# Patient Record
Sex: FEMALE | Race: WHITE
[De-identification: names, ages, dates, MRNs, and addresses within clinical notes are randomized per-mention and may not be internally consistent; named-entity substitution may affect disease eponyms.]

---

## 2017-03-01 ENCOUNTER — HOSPITAL ENCOUNTER (EMERGENCY)
Dept: HOSPITAL 25 - ED | Age: 82
Discharge: HOME | End: 2017-03-01
Payer: MEDICARE

## 2017-03-01 VITALS — DIASTOLIC BLOOD PRESSURE: 71 MMHG | SYSTOLIC BLOOD PRESSURE: 150 MMHG

## 2017-03-01 DIAGNOSIS — Z87.891: ICD-10-CM

## 2017-03-01 DIAGNOSIS — R53.1: Primary | ICD-10-CM

## 2017-03-01 DIAGNOSIS — R60.9: ICD-10-CM

## 2017-03-01 DIAGNOSIS — R50.9: ICD-10-CM

## 2017-03-01 LAB
ALBUMIN SERPL BCG-MCNC: 4.1 G/DL (ref 3.2–5.2)
ALP SERPL-CCNC: 61 U/L (ref 34–104)
ALT SERPL W P-5'-P-CCNC: 6 U/L (ref 7–52)
ANION GAP SERPL CALC-SCNC: 9 MMOL/L (ref 2–11)
AST SERPL-CCNC: 13 U/L (ref 13–39)
BUN SERPL-MCNC: 14 MG/DL (ref 6–24)
BUN/CREAT SERPL: 19.7 (ref 8–20)
CALCIUM SERPL-MCNC: 10.2 MG/DL (ref 8.6–10.3)
CHLORIDE SERPL-SCNC: 101 MMOL/L (ref 101–111)
GLOBULIN SER CALC-MCNC: 2.9 G/DL (ref 2–4)
GLUCOSE SERPL-MCNC: 122 MG/DL (ref 70–100)
HCO3 SERPL-SCNC: 27 MMOL/L (ref 22–32)
HCT VFR BLD AUTO: 47 % (ref 35–47)
HGB BLD-MCNC: 14.8 G/DL (ref 12–16)
MAGNESIUM SERPL-MCNC: 1.8 MG/DL (ref 1.9–2.7)
MCH RBC QN AUTO: 30 PG (ref 27–31)
MCHC RBC AUTO-ENTMCNC: 32 G/DL (ref 31–36)
MCV RBC AUTO: 94 FL (ref 80–97)
POTASSIUM SERPL-SCNC: 4.1 MMOL/L (ref 3.5–5)
PROT SERPL-MCNC: 7 G/DL (ref 6.4–8.9)
RBC # BLD AUTO: 4.97 10^6/UL (ref 4–5.4)
SODIUM SERPL-SCNC: 137 MMOL/L (ref 133–145)
TROPONIN I SERPL-MCNC: 0 NG/ML (ref ?–0.04)
TSH SERPL-ACNC: 1.01 MCIU/ML (ref 0.34–5.6)
WBC # BLD AUTO: 7.7 10^3/UL (ref 3.5–10.8)

## 2017-03-01 PROCEDURE — 80053 COMPREHEN METABOLIC PANEL: CPT

## 2017-03-01 PROCEDURE — 99283 EMERGENCY DEPT VISIT LOW MDM: CPT

## 2017-03-01 PROCEDURE — 83880 ASSAY OF NATRIURETIC PEPTIDE: CPT

## 2017-03-01 PROCEDURE — 36415 COLL VENOUS BLD VENIPUNCTURE: CPT

## 2017-03-01 PROCEDURE — 84484 ASSAY OF TROPONIN QUANT: CPT

## 2017-03-01 PROCEDURE — 81003 URINALYSIS AUTO W/O SCOPE: CPT

## 2017-03-01 PROCEDURE — 71020: CPT

## 2017-03-01 PROCEDURE — 85730 THROMBOPLASTIN TIME PARTIAL: CPT

## 2017-03-01 PROCEDURE — 85025 COMPLETE CBC W/AUTO DIFF WBC: CPT

## 2017-03-01 PROCEDURE — 74176 CT ABD & PELVIS W/O CONTRAST: CPT

## 2017-03-01 PROCEDURE — 87040 BLOOD CULTURE FOR BACTERIA: CPT

## 2017-03-01 PROCEDURE — 83605 ASSAY OF LACTIC ACID: CPT

## 2017-03-01 PROCEDURE — 81015 MICROSCOPIC EXAM OF URINE: CPT

## 2017-03-01 PROCEDURE — 83735 ASSAY OF MAGNESIUM: CPT

## 2017-03-01 PROCEDURE — 84443 ASSAY THYROID STIM HORMONE: CPT

## 2017-03-01 PROCEDURE — 86140 C-REACTIVE PROTEIN: CPT

## 2017-03-01 NOTE — RAD
Indication: Fever, weakness.



Comparison: September 05, 2016 abdomen CT and September 05, 2016 chest radiograph.

September 05, 2016 thoracic spine CT.



Technique: Sitting AP and lateral chest views.



Report: Elevated lung volumes and both mild prominence and rarefaction of the interstitial

markings. No alveolar consolidation, focal pulmonary lesion, pleural effusion,

pneumothorax. Upper normal heart size. Unremarkable central pulmonary vasculature. T8

vertebral compression fracture is chronic. Bone density appears decreased throughout.



IMPRESSION: Stigmata of chronic obstructive pulmonary disease and emphysema. No acute

cardiopulmonary process evident.

## 2017-03-01 NOTE — RAD
INDICATION: Weakness, multiple falls. Remote appendectomy. .



COMPARISON:  2016 and May 25, 2014 CT exams.



TECHNIQUE: Multidetector CT images were obtained from the lung bases to the ischial

tuberosities. Evaluation of the viscera is limited without IV contrast. Multiplanar

reformation.



REPORT: Visualized lung bases are remarkable for emphysema.



Unremarkable unenhanced liver and gallbladder. Moderately atrophic pancreas without

suspicious finding. Unremarkable spleen.



Large medially projecting diverticulum from the second segment of the duodenum without

suggestion of inflammatory change. Unremarkable unopacified small bowel loops. Moderate

diverticulosis primarily at the sigmoid colon without findings of diverticulitis. Small

volume of free fluid in the pelvis. Negative for free air or significant hernias.



Normal adrenal glands. Negative for hydronephrosis. There are bilateral renal cysts with

large cyst at the RIGHT kidney with multiple demonstrating marginal calcification without

significant change in size or degree of calcification compared with the 2014 CT consistent

with benign etiology. The dominant cyst at the LEFT kidney is at the upper pole measuring

up to 8.3 cm diameter. No renal collecting system stones or hydronephrosis. Unremarkable

nondilated ureters. Unremarkable moderately distended urinary bladder. No significant

change in calcified uterine fibroids compared with the 2014 exam. Unremarkable adnexal

regions.



Negative for lymphadenopathy. Normal diameter abdominal aorta and iliac arteries with

significant calcific plaque.



Negative for retroperitoneal or superficial soft tissue plane hematoma.



Polyarticular degenerative arthropathy. Moderate RIGHT convex curve of the lumbar spine.

No fractures or suspicious focal osseous lesions evident.



IMPRESSION: 

1. No acute abdominal pelvic pathologic process evident.

2. Moderate colonic diverticulosis without findings of diverticulitis.

3. No significant change in large LEFT renal cyst with mural calcification compared with

the 2014 exam.

4. Negative for obstructive uropathy.

5. Negative for aortic aneurysm.

6. Negative for loculated hematoma or fracture.

## 2017-03-02 NOTE — ED
Nigel HEREDIA Adam, scribed for Alli Dolan MD on 17 at 1902 .





Complex/Multi-Sys Presentation





- HPI Summary


HPI Summary: 


Pt is an 86 year old female presenting with increasing weakness. Her daughter 

states that the pt has grown very weak in the past few days and is no longer 

able to walk. She has fallen 2x in the last few days which is rare for her. She 

also presents with left foot swelling with redness. Last week her prednisone 

dosage was raised from 10 to 40. Today is her 4th day on 40 mg and she is not 

doing well. In addition to the weakness and LLE symptoms, her daughter states 

that she has been having generalized myalgia and she seems to have a low fever. 

Pt is a former smoker. PMHx includes lupus, HTN, DVT, GERD, osteoporosis, 

depression, and sciatic pain.








- History Of Current Complaint


Chief Complaint: EDWeakness


Time Seen by Provider: 17 18:51


Hx Obtained From: Patient


Onset/Duration: Gradual Onset, Lasting Days, Still Present


Timing: Constant


Severity Currently: Moderate


Severity Initially: Mild


Location: Pain At: - Generalized


Associated Signs And Symptoms: Positive: Weakness, Edema - Left foot, with 

redness, Fever





- Allergies/Home Medications


Allergies/Adverse Reactions: 


 Allergies











Allergy/AdvReac Type Severity Reaction Status Date / Time


 


Penicillins [PCN] Allergy Intermediate Rash Verified 16 11:22














PMH/Surg Hx/FS Hx/Imm Hx


Endocrine/Hematology History: Reports: Hx Systemic Lupus Erythematosus, Other 

Endocrine/Hematological Disorders - LUPUS


   Denies: Hx Anticoagulant Therapy, Hx Blood Disorders, Hx Blood Transfusions, 

Hx Bone Marrow Disease, Hx Diabetes, Hx Sickle Cell Disease, Hx Thyroid Disease

, Hx Anemia, Hx Unexplained Bleeding


Cardiovascular History: Reports: Hx Deep Vein Thrombosis, Hx Hypertension, 

Other Cardiovascular Problems/Disorders - LUPUS


   Denies: Hx Congestive Heart Failure, Hx Pacemaker/ICD


Respiratory History: 


   Denies: Hx Asthma, Hx Chronic Obstructive Pulmonary Disease (COPD)


GI History: Reports: Hx Gastroesophageal Reflux Disease, Hx Irritable Bowel, 

Other GI Disorders - GERD


   Denies: Hx Cirrhosis, Hx Crohn's Disease, Hx Diverticulosis, Hx Gall Bladder 

Disease, Hx Gastrointestinal Bleed, Hx Hiatal Hernia, Hx Jaundice, Hx 

Obstructive Bowel, Hx Ileostomy, Hx Pyloric Stenosis, Hx Ulcer


 History: Reports: Other  Problems/Disorders - UTIs several x's a yr


   Denies: Hx Dialysis, Hx Renal Disease


Musculoskeletal History: Reports: Hx Arthritis, Hx Back Problems, Hx 

Fibromyalgia, Other Musculoskeletal History - DJD-NECK, FIBROMYALGIA, 

RHEUMATOID ARTHRITIS


   Denies: Hx Rheumatoid Arthritis, Hx Bursitis, Hx Congenital Bone 

Abnormalities, Hx Gout, Hx Orthopedic Injury, Hx Osteoporosis, Hx Scoliosis, Hx 

Tendonitis


Sensory History: Reports: Hx Cataracts, Hx Contacts or Glasses, Hx Vision 

Problem


   Denies: Hx Eye Injury, Hx Eye Prosthesis, Hx Glaucoma, Hx Macular 

Degeneration, Hx Deafness, Hx Hearing Aid, Hx Hearing Problem, Other Sensory 

Impairments


Opthamlomology History: Reports: Hx Cataracts, Hx Contacts or Glasses, Hx 

Vision Problem


   Denies: Hx Eye Injury, Hx Eye Prosthesis, Hx Glaucoma, Hx Macular 

Degeneration, Other Sensory Impairments


Neurological History: 


   Denies: Hx Dementia, Hx Seizures


Psychiatric History: Reports: Hx Anxiety - LUPUS RELATED, Hx Eating Disorder, 

Hx Depression - LUPUS RELATED


   Denies: Hx Attention Deficit Hyperactivity Disorder, Hx Panic Disorder, Hx 

Post Traumatic Stress Disorder, Hx Inpatient Treatment, Hx Schizophrenia, Hx 

Bipolar Disorder, Hx Suicide Attempt, Hx Substance Abuse, Other Psychiatric 

Issues/Disorders





- Cancer History


Hx Chemotherapy: No





- Surgical History


Surgery Procedure, Year, and Place: .  APPENDECTOMY 40 YEARS AGO (?)


Hx Anesthesia Reactions: No


Infectious Disease History: No


Infectious Disease History: 


   Denies: Hx Clostridium Difficile, Hx Hepatitis, Hx Human Immunodeficiency 

Virus (HIV), Hx of Known/Suspected MRSA, Hx Shingles, Hx Tuberculosis, Traveled 

Outside the [place] in Last 30 Days





- Family History


Known Family History: Positive: None - Pt denies any FMHx





- Social History


Occupation: Retired


Lives: Alone


Alcohol Use: None


Hx Substance Use: No


Substance Use Type: Reports: None


Hx Tobacco Use: Yes


Smoking Status (MU): Former Smoker


Type: Cigarettes


Amount Used/How Often: 40 years


Have You Smoked in the Last Year: No





Review of Systems


Positive: Fever


Negative: Diarrhea


Positive: Myalgia - Generalized, Edema - Left foot


Positive: Other - Redness, left foot


Positive: Weakness


All Other Systems Reviewed And Are Negative: Yes





Physical Exam





- Summary


Physical Exam Summary: 





VITAL SIGNS: Reviewed. 


GENERAL: Patient is a this female who is lying comfortable in the stretcher. 

Patient is not in any acute respiratory distress. 


HEAD AND FACE: No signs of trauma. No ecchymosis, hematomas or skull 

depressions. No sinus tenderness. 


EYES: PERRLA, EOMI x 2, No injected conjunctiva, no nystagmus. No photophobia.


EARS: Hearing grossly intact. Ear canals and tympanic membranes are within 

normal limits. 


MOUTH: Oropharynx within normal limits. 


NECK: Supple, trachea is midline, no adenopathy, no JVD, no carotid bruit, no c-

spine tenderness, neck with full ROM. No meningeal signs, no Kernig's or 

brudzinskis signs. 


CHEST: Symmetric, no tenderness at palpation 


LUNGS: Clear to auscultation bilaterally. No wheezing or crackles.


CVS: Regular rate and rhythm, S1 and S2 present, no murmurs or gallops 

appreciated. 


ABDOMEN: Soft, non-tender. No signs of distention. No rebound no guarding, and 

no masses palpated. Bowel sounds are normal. 


EXTREMITIES: FROM in all major joints, no edema, no cyanosis or clubbing.


NEURO: Alert and oriented x 3. No acute neurological deficits. Speech is normal 

and follows commands. 


SKIN: Dry and warm 





Triage Information Reviewed: Yes


Vital Signs On Initial Exam: 


 Initial Vitals











Temp Pulse Resp BP Pulse Ox


 


 101 F   72   20   129/81   98 


 


 17 18:04  17 18:04  17 18:04  17 18:04  17 18:04











Vital Signs Reviewed: Yes





- Danielle Coma Scale


Coma Scale Total: 15





Diagnostics





- Vital Signs


 Vital Signs











  Temp Pulse Resp BP Pulse Ox


 


 17 18:15   70    97


 


 17 18:04  101 F  72  20  129/81  98














- Laboratory


Lab Results: 


 Lab Results











  17 Range/Units





  20:05 20:05 20:05 


 


WBC  7.7    (3.5-10.8)  10^3/ul


 


RBC  4.97    (4.0-5.4)  10^6/ul


 


Hgb  14.8    (12.0-16.0)  g/dl


 


Hct  47    (35-47)  %


 


MCV  94    (80-97)  fL


 


MCH  30    (27-31)  pg


 


MCHC  32    (31-36)  g/dl


 


RDW  15    (10.5-15)  %


 


Plt Count  306    (150-450)  10^3/ul


 


MPV  8    (7.4-10.4)  um3


 


Neut % (Auto)  88.0 H    (38-83)  %


 


Lymph % (Auto)  10.4 L    (25-47)  %


 


Mono % (Auto)  1.3    (1-9)  %


 


Eos % (Auto)  0    (0-6)  %


 


Baso % (Auto)  0.3    (0-2)  %


 


Absolute Neuts (auto)  6.8    (1.5-7.7)  10^3/ul


 


Absolute Lymphs (auto)  0.8 L    (1.0-4.8)  10^3/ul


 


Absolute Monos (auto)  0.1    (0-0.8)  10^3/ul


 


Absolute Eos (auto)  0    (0-0.6)  10^3/ul


 


Absolute Basos (auto)  0    (0-0.2)  10^3/ul


 


Absolute Nucleated RBC  0.01    10^3/ul


 


Nucleated RBC %  0.1    


 


APTT   31.1   (26.0-36.3)  seconds


 


Sodium    137  (133-145)  mmol/L


 


Potassium    4.1  (3.5-5.0)  mmol/L


 


Chloride    101  (101-111)  mmol/L


 


Carbon Dioxide    27  (22-32)  mmol/L


 


Anion Gap    9  (2-11)  mmol/L


 


BUN    14  (6-24)  mg/dL


 


Creatinine    0.71  (0.51-0.95)  mg/dL


 


Est GFR ( Amer)    100.4  (>60)  


 


Est GFR (Non-Af Amer)    78.1  (>60)  


 


BUN/Creatinine Ratio    19.7  (8-20)  


 


Glucose    122 H  ()  mg/dL


 


Lactic Acid     (0.5-2.0)  mmol/L


 


Calcium    10.2  (8.6-10.3)  mg/dL


 


Magnesium    1.8 L  (1.9-2.7)  mg/dL


 


Total Bilirubin    0.40  (0.2-1.0)  mg/dL


 


AST    13  (13-39)  U/L


 


ALT    6 L  (7-52)  U/L


 


Alkaline Phosphatase    61  ()  U/L


 


Troponin I    0.00  (<0.04)  ng/mL


 


C-Reactive Protein    1.94  (< 5.00)  mg/L


 


B-Natriuretic Peptide    ( - 100) pg/mL


 


Total Protein    7.0  (6.4-8.9)  g/dL


 


Albumin    4.1  (3.2-5.2)  g/dL


 


Globulin    2.9  (2-4)  g/dL


 


Albumin/Globulin Ratio    1.4  (1-3)  


 


TSH    1.01  (0.34-5.60)  mcIU/mL














  17 Range/Units





  20:05 20:05 


 


WBC    (3.5-10.8)  10^3/ul


 


RBC    (4.0-5.4)  10^6/ul


 


Hgb    (12.0-16.0)  g/dl


 


Hct    (35-47)  %


 


MCV    (80-97)  fL


 


MCH    (27-31)  pg


 


MCHC    (31-36)  g/dl


 


RDW    (10.5-15)  %


 


Plt Count    (150-450)  10^3/ul


 


MPV    (7.4-10.4)  um3


 


Neut % (Auto)    (38-83)  %


 


Lymph % (Auto)    (25-47)  %


 


Mono % (Auto)    (1-9)  %


 


Eos % (Auto)    (0-6)  %


 


Baso % (Auto)    (0-2)  %


 


Absolute Neuts (auto)    (1.5-7.7)  10^3/ul


 


Absolute Lymphs (auto)    (1.0-4.8)  10^3/ul


 


Absolute Monos (auto)    (0-0.8)  10^3/ul


 


Absolute Eos (auto)    (0-0.6)  10^3/ul


 


Absolute Basos (auto)    (0-0.2)  10^3/ul


 


Absolute Nucleated RBC    10^3/ul


 


Nucleated RBC %    


 


APTT    (26.0-36.3)  seconds


 


Sodium    (133-145)  mmol/L


 


Potassium    (3.5-5.0)  mmol/L


 


Chloride    (101-111)  mmol/L


 


Carbon Dioxide    (22-32)  mmol/L


 


Anion Gap    (2-11)  mmol/L


 


BUN    (6-24)  mg/dL


 


Creatinine    (0.51-0.95)  mg/dL


 


Est GFR ( Amer)    (>60)  


 


Est GFR (Non-Af Amer)    (>60)  


 


BUN/Creatinine Ratio    (8-20)  


 


Glucose    ()  mg/dL


 


Lactic Acid  1.1   (0.5-2.0)  mmol/L


 


Calcium    (8.6-10.3)  mg/dL


 


Magnesium    (1.9-2.7)  mg/dL


 


Total Bilirubin    (0.2-1.0)  mg/dL


 


AST    (13-39)  U/L


 


ALT    (7-52)  U/L


 


Alkaline Phosphatase    ()  U/L


 


Troponin I    (<0.04)  ng/mL


 


C-Reactive Protein    (< 5.00)  mg/L


 


B-Natriuretic Peptide   217 H ( - 100) pg/mL


 


Total Protein    (6.4-8.9)  g/dL


 


Albumin    (3.2-5.2)  g/dL


 


Globulin    (2-4)  g/dL


 


Albumin/Globulin Ratio    (1-3)  


 


TSH    (0.34-5.60)  mcIU/mL











Result Diagrams: 


 17 20:05





 17 20:05


Lab Statement: Any lab studies that have been ordered have been reviewed, and 

results considered in the medical decision making process.





- Radiology


  ** CXR


Radiology Interpretation Completed By: Radiologist - IMPRESSION: Stigmata of 

chronic obstructive pulmonary disease and emphysema. No acute cardiopulmonary 

process evident.





- CT


  ** A/P


CT Interpretation Completed By: Radiologist - IMPRESSION:  1. No acute 

abdominal pelvic pathologic process evident. 2. Moderate colonic diverticulosis 

without findings of diverticulitis. 3. No significant change in large LEFT 

renal cyst with mural calcification compared with the 2014 exam. 4. Negative 

for obstructive uropathy. 5. Negative for aortic aneurysm. 6. Negative for 

loculated hematoma or fracture.





- Additional Comments


Diagnostic Additional Comments: 


Troponin I - 0.00








Complex Multi-Symp Course/Dx


Assessment/Plan: Pt is an 86 year old female presenting with increasing 

weakness. Her daughter states that the pt has grown very weak in the past few 

days and is no longer able to walk. She has fallen 2x in the last few days 

which is rare for her. She also presents with left foot swelling with redness. 

Last week her prednisone dosage was raised from 10 to 40. Today is her 4th day 

on 40 mg and she is not doing well. In addition to the weakness and LLE symptoms

, her daughter states that she has been having generalized myalgia and she 

seems to have a low fever. Pt is a former smoker. PMHx includes lupus, HTN, DVT

, GERD, osteoporosis, depression, and sciatic pain.  Fall precautions were 

ordered.  Blood work shows hyperglycemia of 122, Mg+ 1.8 .  Initially 

she was given Magnesium.  Abdominal and pelvic CT was done since she report 

occasional abdominal pain. Impression as above.  I did not perform an Head CT 

since she is neurological intact. She is ambulating in the ED with good steady 

walk.  CXR: Stigmata for COPD, with no acute disease.  In the Ed course she was 

ambulated with good steady walk. Daughter reports that she will staying with 

her at home and she will be moving with her in the next couple days. She will 

be discharged home with f/u of PMD in the next 1-2 days.





- Diagnoses


Differential Diagnoses/HQI/PQRI: Other - Mechanical falls, UTI, weakness


Provider Diagnoses: 


 Weakness, Accidental fall








Discharge





- Discharge Plan


Condition: Stable


Disposition: HOME


Patient Education Materials:  Weakness (ED), Fall Prevention for Older Adults (

ED)


Referrals: 


Chato Sapp MD [Primary Care Provider] - 


Additional Instructions: 


Follow up with Dr. Sapp.





The documentation as recorded by the Nigel yi Adam accurately reflects 

the service I personally performed and the decisions made by me, Alli Dolan MD.

## 2017-07-24 ENCOUNTER — HOSPITAL ENCOUNTER (EMERGENCY)
Dept: HOSPITAL 25 - ED | Age: 82
Discharge: LEFT BEFORE DISCHARGE | End: 2017-07-24
Payer: MEDICARE

## 2017-07-24 DIAGNOSIS — Z53.21: Primary | ICD-10-CM

## 2017-07-24 PROCEDURE — 99281 EMR DPT VST MAYX REQ PHY/QHP: CPT

## 2017-09-27 ENCOUNTER — HOSPITAL ENCOUNTER (INPATIENT)
Dept: HOSPITAL 25 - ED | Age: 82
LOS: 3 days | Discharge: HOME HEALTH | DRG: 690 | End: 2017-09-30
Attending: INTERNAL MEDICINE | Admitting: INTERNAL MEDICINE
Payer: MEDICARE

## 2017-09-27 DIAGNOSIS — Z98.41: ICD-10-CM

## 2017-09-27 DIAGNOSIS — K57.92: ICD-10-CM

## 2017-09-27 DIAGNOSIS — F32.9: ICD-10-CM

## 2017-09-27 DIAGNOSIS — Z87.891: ICD-10-CM

## 2017-09-27 DIAGNOSIS — D73.5: ICD-10-CM

## 2017-09-27 DIAGNOSIS — N39.0: Primary | ICD-10-CM

## 2017-09-27 DIAGNOSIS — K21.9: ICD-10-CM

## 2017-09-27 DIAGNOSIS — K57.30: ICD-10-CM

## 2017-09-27 DIAGNOSIS — I10: ICD-10-CM

## 2017-09-27 DIAGNOSIS — Z88.0: ICD-10-CM

## 2017-09-27 DIAGNOSIS — M32.9: ICD-10-CM

## 2017-09-27 DIAGNOSIS — Z79.52: ICD-10-CM

## 2017-09-27 DIAGNOSIS — M06.9: ICD-10-CM

## 2017-09-27 DIAGNOSIS — K58.9: ICD-10-CM

## 2017-09-27 DIAGNOSIS — Z98.42: ICD-10-CM

## 2017-09-27 DIAGNOSIS — G89.29: ICD-10-CM

## 2017-09-27 DIAGNOSIS — M79.7: ICD-10-CM

## 2017-09-27 DIAGNOSIS — B96.1: ICD-10-CM

## 2017-09-27 DIAGNOSIS — M19.90: ICD-10-CM

## 2017-09-27 DIAGNOSIS — Z79.01: ICD-10-CM

## 2017-09-27 DIAGNOSIS — F50.9: ICD-10-CM

## 2017-09-27 DIAGNOSIS — Z86.718: ICD-10-CM

## 2017-09-27 DIAGNOSIS — F41.9: ICD-10-CM

## 2017-09-27 DIAGNOSIS — N28.1: ICD-10-CM

## 2017-09-27 LAB
ADD DIFF/SLIDE REVIEW?: (no result)
ALBUMIN SERPL BCG-MCNC: 3.5 G/DL (ref 3.2–5.2)
ALP SERPL-CCNC: 49 U/L (ref 34–104)
ALT SERPL W P-5'-P-CCNC: 10 U/L (ref 7–52)
ANION GAP SERPL CALC-SCNC: 7 MMOL/L (ref 2–11)
AST SERPL-CCNC: 13 U/L (ref 13–39)
BUN SERPL-MCNC: 17 MG/DL (ref 6–24)
BUN/CREAT SERPL: 22.4 (ref 8–20)
CALCIUM SERPL-MCNC: 8.8 MG/DL (ref 8.6–10.3)
CHLORIDE SERPL-SCNC: 103 MMOL/L (ref 101–111)
GLOBULIN SER CALC-MCNC: 2.4 G/DL (ref 2–4)
GLUCOSE SERPL-MCNC: 119 MG/DL (ref 70–100)
HCO3 SERPL-SCNC: 26 MMOL/L (ref 22–32)
HCT VFR BLD AUTO: 44 % (ref 35–47)
HGB BLD-MCNC: 14.2 G/DL (ref 12–16)
LIPASE SERPL-CCNC: 26 U/L (ref 11–82)
MCH RBC QN AUTO: 30 PG (ref 27–31)
MCHC RBC AUTO-ENTMCNC: 32 G/DL (ref 31–36)
MCV RBC AUTO: 93 FL (ref 80–97)
POTASSIUM SERPL-SCNC: 4.4 MMOL/L (ref 3.5–5)
PROT SERPL-MCNC: 5.9 G/DL (ref 6.4–8.9)
RBC # BLD AUTO: 4.72 10^6/UL (ref 4–5.4)
SODIUM SERPL-SCNC: 136 MMOL/L (ref 133–145)
TROPONIN I SERPL-MCNC: 0 NG/ML (ref ?–0.04)
TSH SERPL-ACNC: 0.93 MCIU/ML (ref 0.34–5.6)
WBC # BLD AUTO: 11.1 10^3/UL (ref 3.5–10.8)

## 2017-09-27 PROCEDURE — 87077 CULTURE AEROBIC IDENTIFY: CPT

## 2017-09-27 PROCEDURE — 74177 CT ABD & PELVIS W/CONTRAST: CPT

## 2017-09-27 PROCEDURE — 85610 PROTHROMBIN TIME: CPT

## 2017-09-27 PROCEDURE — 81015 MICROSCOPIC EXAM OF URINE: CPT

## 2017-09-27 PROCEDURE — 84443 ASSAY THYROID STIM HORMONE: CPT

## 2017-09-27 PROCEDURE — 80053 COMPREHEN METABOLIC PANEL: CPT

## 2017-09-27 PROCEDURE — 81003 URINALYSIS AUTO W/O SCOPE: CPT

## 2017-09-27 PROCEDURE — 80048 BASIC METABOLIC PNL TOTAL CA: CPT

## 2017-09-27 PROCEDURE — 83690 ASSAY OF LIPASE: CPT

## 2017-09-27 PROCEDURE — 85025 COMPLETE CBC W/AUTO DIFF WBC: CPT

## 2017-09-27 PROCEDURE — 36415 COLL VENOUS BLD VENIPUNCTURE: CPT

## 2017-09-27 PROCEDURE — 87493 C DIFF AMPLIFIED PROBE: CPT

## 2017-09-27 PROCEDURE — 71020: CPT

## 2017-09-27 PROCEDURE — 87186 SC STD MICRODIL/AGAR DIL: CPT

## 2017-09-27 PROCEDURE — 84484 ASSAY OF TROPONIN QUANT: CPT

## 2017-09-27 PROCEDURE — 86140 C-REACTIVE PROTEIN: CPT

## 2017-09-27 PROCEDURE — 87086 URINE CULTURE/COLONY COUNT: CPT

## 2017-09-27 PROCEDURE — 85379 FIBRIN DEGRADATION QUANT: CPT

## 2017-09-27 RX ADMIN — CEFTRIAXONE SODIUM SCH MLS/HR: 1 INJECTION, POWDER, FOR SOLUTION INTRAMUSCULAR; INTRAVENOUS at 21:38

## 2017-09-27 RX ADMIN — CARVEDILOL SCH MG: 3.12 TABLET, FILM COATED ORAL at 21:38

## 2017-09-27 RX ADMIN — CLONAZEPAM SCH MG: 0.5 TABLET ORAL at 21:39

## 2017-09-27 RX ADMIN — MYCOPHENOLATE MOFETIL SCH MG: 500 TABLET, FILM COATED ORAL at 21:41

## 2017-09-27 RX ADMIN — SODIUM CHLORIDE SCH MLS/HR: 900 IRRIGANT IRRIGATION at 21:47

## 2017-09-27 RX ADMIN — GABAPENTIN SCH MG: 300 CAPSULE ORAL at 21:39

## 2017-09-27 RX ADMIN — BUSPIRONE HYDROCHLORIDE SCH MG: 5 TABLET ORAL at 21:39

## 2017-09-27 NOTE — HP
CC:  Dr. Sapp*

 

HISTORY AND PHYSICAL:

 

DATE OF ADMISSION:  09/27/17

 

PRIMARY CARE PROVIDER:  Dr. Sapp.

 

CHIEF COMPLAINT:  Abdominal pain.

 

HISTORY OF PRESENT ILLNESS:  Jillian Golden is an 86-year-old female with a 
history of lupus, anxiety, tardive dyskinesia, irritable bowel syndrome, who 
presents to the hospital complaining of 3 months of abdominal pain.  She 
apparently had a CT of the abdomen to be obtained as an outpatient, but she 
came into the hospital for evaluation.  Three days ago, she developed diarrhea.
  She stated that she is incontinent of her bowel movements and she is unsure 
how often she goes, but her daughter stated that her bowel movements are now 
"pure liquid."  She had been having diarrhea for 3 days.  The abdominal pain is 
chronic, intermittent, and localized in the left mid abdomen.  The diarrhea got 
her weaker and they came into the ED for evaluation.  Here, her CT of the 
abdomen is basically unremarkable apart from possibility of  bezoar in the 
cecum.  She also has possible UTI.  She is going to be admitted for an 
overnight observation.

 

PAST MEDICAL HISTORY:

1.  SLE, on CellCept and prednisone.

2.  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 
on Coumadin.

3.  History of anxiety and depression.

4.  Chronic pain due to T8 compression fracture and scoliosis.

5.  History of small bowel obstruction, status post exploratory laparotomy in 
2015 with removal of bezoar by Dr. Graves as well as abscess of the appendix at 
the same time.

6.  History of osteoarthritis.

7.  Gastroesophageal reflux disease.

8.  Irritable bowel syndrome.

9.  Cataract surgery bilaterally.

10.  Hypertension.

 

OUTPATIENT MEDICATIONS:  Include:

 

1.  Coumadin 5 mg daily.

2.  Baclofen 10 mg t.i.d. p.r.n.

3.  Fosamax 70 mg weekly.

4.  BuSpar 5 mg b.i.d.

5.  Calcium carbonate 1 tablet daily.

6.  Gabapentin 300 mg at bedtime.

7.  Coreg 3.125 mg b.i.d.

8.  Compazine 10 mg daily p.r.n.

9.  Paroxetine 30 mg daily.

10.  CellCept 500 mg b.i.d.

11.  Norco 5/325 one tablet up to 4 times a day p.r.n.

12.  Prednisone 10 mg daily.

13.  Clonazepam 0.5 mg at bedtime.

 

ALLERGIES:  PENICILLIN.

 

FAMILY HISTORY:  Positive for diabetes and heart disease.

 

SOCIAL HISTORY:  The patient denies any current smoking.  She quit smoking 40 
years ago.  There is a history of 10-pack-year smoking.  She denies any alcohol 
or drug use.  She lives at her own trailer.  Her daughter, Holly, is her 
healthcare proxy. Her other daughter is an emergency room physician, who lives 
in [place].  She ambulates with a roller walker.

 

REVIEW OF SYSTEMS:  Please see history of present illness.  In addition to the 
above mentioned, all other 14 systems were reviewed and were otherwise negative.

 

                               PHYSICAL EXAMINATION

 

GENERAL APPEARANCE:  The patient is a very pleasant 86-year-old female, who is 
in no acute distress.  Alert, awake, and oriented x3.

 

VITAL SIGNS:  Blood pressure of 131/67, heart rate of 109 on room air, 
respiratory rate 18, oxygen saturation 95% on room air, and temperature of 97.7.

 

HEENT:  Head atraumatic, normocephalic.  Eyes, pupils are equal, round, and 
reactive to light and accommodation.  Oropharynx clear.  Mucosa moist.

 

NECK:  Supple.  No JVD, no bruits bilaterally.

 

RESPIRATORY:  Clear to auscultation bilaterally.

 

CARDIOVASCULAR:  Regular rate and rhythm.  No murmur.

 

ABDOMEN:  Soft, distended, nontender.  Bowel sounds are present in all 4 
quadrants.

 

EXTREMITIES:  There is no edema.  Pulses are +2 bilaterally.  No clubbing or 
cyanosis.

 

NEUROLOGIC:  Noted frequent smacking of the lips due to tardive dyskinesia.  
Motor strength otherwise is 5/5 bilaterally.  Sensation is intact throughout.  
Speech is clear.

 

 LABORATORY DATA:  Showed white blood cell count of 11.1, hemoglobin of 14.2, 
hematocrit of 44, and platelets of 315.

 

Sodium of 136, potassium 4.4, chloride 103, carbon dioxide 26, BUN 17, 
creatinine 0.76.  Liver function test is unremarkable.  C-reactive protein of 7.

 

Urinalysis showed +3 wbc's, +2 bacteria, +3 esterase, and +2 blood.

 

CT of abdomen and pelvis obtained at admission.  Impression:  "Small 
pericardial effusion at the base of the heart.  Mild nonspecific enhancement of 
gallbladder wall.  Stable low density splenic lesion.  Numerous large left 
renal cysts and small right renal cysts with peripheral calcifications, there 
is stability. Suspect ingested foreign body residing in the cecum.  This is 
likely an incidental finding.  Scattered diverticula of the sigmoid and 
descending colon.  Uterine leiomyomas."

 

Portable chest x-ray.  Impression:  "Hyperinflation consistent with COPD.  No 
active cardiopulmonary disease."

 

ASSESSMENT AND PLAN:

1.  In regards to the patient's abdominal pain, I suspect that the patient may 
have another bezoar that caused for the patient to be likely constipated.  She 
probably now has liquid stool that is flowing around the bezoar.  At this point
, I will observe the patient on medical floor.  I will place her on simethicone 
for gas pains.  Also asked gastroenterologist to see the patient in the morning.

2.  In regards to the patient's urinary tract infection, the patient was 
started on Levaquin.  I will place her on ceftriaxone beginning tomorrow.

3.  For the patient's lupus, the patient is going to be continued on CellCept 
and prednisone.

4.  In regards to the patient's history of splenic infarcts, the patient's INR 
is 1.96 and her current Coumadin dose is going to be continued.

5.  For the patient's diarrhea, once again, it is probably flowing around the 
bezoar.  At this point I will check it for C. diff though.

6.  For DVT prophylaxis, the patient is to be continued on Coumadin with daily 
INRs.

7.  For hypertension, Coreg is going to be continued.

8.  For code status, the patient is a full code and her surrogate is her 
daughter, Holly.

 

TIME SPENT:  Approximately 60 minutes were spent on admission of this patient, 
more than half that time was spent face-to-face with the patient during the 
interview and physical exam.

 

 

 

[id number]/CPS #: [id number]

YAYO

## 2017-09-27 NOTE — RAD
INDICATION: Left upper quadrant pain     



COMPARISON: CT March 01, 2017; CT September 05, 2016

 

TECHNIQUE: Axial source images were obtained from the hemidiaphragms to the symphysis

pubis following administration of oral and intravenous contrast.  71 mL Omnipaque 300 was

utilized. Coronal and sagittal reconstructed images were acquired.



Lung bases: The lung bases are clear. There is mild pleural thickening in the dependent

portions of the lung bases. There is a small pericardial effusion at the base of the heart



Liver: The liver is normal in size. There are no masses. There is no ductal dilatation.



Gallbladder: There are no calcified gallstones. There is no evidence of wall thickening or

pericholecystic fluid. There is mild enhancement of the gallbladder wall.



Spleen: The spleen is normal in size. There are no new masses. There is a stable

low-density splenic lesion which may represent a cyst. This is unchanged from the 2016

study



Pancreas: There is no focal pancreatic mass or ductal dilatation. There is mild pancreatic

atrophy



Adrenal glands: There is no evidence of adrenal mass.



Kidneys: There are numerous renal cysts with dominant left renal cysts measuring up to 8

cm. Several cysts contain peripheral, curvilinear calcifications. The appearance is

unchanged. Smaller cysts are present on the right. There is mild renal parenchymal

thinning bilaterally.. There is prompt perfusion and excretion



Adenopathy: There is no evidence of adenopathy by size criteria.



Fluid collections: There are no free or localized fluid collections.



Vessels:There are atherosclerotic changes involving the aorta and iliac vessels. There is

no focal aneurysm. The IVC appears normal.



GI tract: The upper GI tract is unremarkable. There is a curvilinear artifact in the cecum

which is likely an ingested foreign body. This is likely an incidental finding. There are

scattered diverticula of the sigmoid and descending colon. There is no CT evidence of

diverticulitis. There are no findings of obstruction or perforation.



Pelvic organs: There are numerous calcified uterine leiomyomas, unchanged. There is no

adnexal mass



Bladder: There are no bladder masses.



Abdominal and pelvic soft tissues: The extraperitoneal abdominal and pelvic soft tissues

appear normal..



Osseous structures: There are no acute osseous changes. There are spondylitic changes of

the lumbar spine.



Other: None



IMPRESSION:

1.  Small pericardial effusion at the base the heart.

2.  Mild nonspecific enhancement gallbladder wall.

3.  Stable low-density splenic lesion.

4.  Numerous large left renal cysts and small right renal cyst with peripheral

calcifications. There is stability.

5.  Suspect ingested foreign body residing within the cecum. This is likely an incidental

finding.

6.  Scattered diverticula of the sigmoid and descending colon.

7.  Uterine leiomyomas

## 2017-09-28 LAB
ADD DIFF/SLIDE REVIEW?: (no result)
ANION GAP SERPL CALC-SCNC: 7 MMOL/L (ref 2–11)
BUN SERPL-MCNC: 12 MG/DL (ref 6–24)
BUN/CREAT SERPL: 16.7 (ref 8–20)
CALCIUM SERPL-MCNC: 8.4 MG/DL (ref 8.6–10.3)
CHLORIDE SERPL-SCNC: 107 MMOL/L (ref 101–111)
GLUCOSE SERPL-MCNC: 87 MG/DL (ref 70–100)
HCO3 SERPL-SCNC: 25 MMOL/L (ref 22–32)
HCT VFR BLD AUTO: 40 % (ref 35–47)
HGB BLD-MCNC: 13.2 G/DL (ref 12–16)
MCH RBC QN AUTO: 31 PG (ref 27–31)
MCHC RBC AUTO-ENTMCNC: 33 G/DL (ref 31–36)
MCV RBC AUTO: 94 FL (ref 80–97)
POTASSIUM SERPL-SCNC: 3.6 MMOL/L (ref 3.5–5)
RBC # BLD AUTO: 4.22 10^6/UL (ref 4–5.4)
SODIUM SERPL-SCNC: 139 MMOL/L (ref 133–145)
WBC # BLD AUTO: 9.6 10^3/UL (ref 3.5–10.8)

## 2017-09-28 RX ADMIN — SODIUM CHLORIDE SCH MLS/HR: 900 IRRIGANT IRRIGATION at 14:01

## 2017-09-28 RX ADMIN — MYCOPHENOLATE MOFETIL SCH MG: 500 TABLET, FILM COATED ORAL at 20:56

## 2017-09-28 RX ADMIN — SIMETHICONE CHEW TAB 80 MG SCH MG: 80 TABLET ORAL at 16:38

## 2017-09-28 RX ADMIN — CARVEDILOL SCH MG: 3.12 TABLET, FILM COATED ORAL at 08:28

## 2017-09-28 RX ADMIN — PREDNISONE SCH MG: 5 TABLET ORAL at 08:27

## 2017-09-28 RX ADMIN — BACLOFEN PRN MG: 10 TABLET ORAL at 20:55

## 2017-09-28 RX ADMIN — METRONIDAZOLE SCH MG: 250 TABLET ORAL at 20:56

## 2017-09-28 RX ADMIN — BUSPIRONE HYDROCHLORIDE SCH MG: 5 TABLET ORAL at 20:54

## 2017-09-28 RX ADMIN — CEFTRIAXONE SODIUM SCH MLS/HR: 1 INJECTION, POWDER, FOR SOLUTION INTRAMUSCULAR; INTRAVENOUS at 20:49

## 2017-09-28 RX ADMIN — BUSPIRONE HYDROCHLORIDE SCH MG: 5 TABLET ORAL at 08:28

## 2017-09-28 RX ADMIN — PAROXETINE HYDROCHLORIDE HEMIHYDRATE SCH MG: 40 TABLET, FILM COATED ORAL at 18:02

## 2017-09-28 RX ADMIN — GABAPENTIN SCH MG: 300 CAPSULE ORAL at 20:57

## 2017-09-28 RX ADMIN — CARVEDILOL SCH MG: 3.12 TABLET, FILM COATED ORAL at 20:56

## 2017-09-28 RX ADMIN — METRONIDAZOLE SCH MG: 250 TABLET ORAL at 14:04

## 2017-09-28 RX ADMIN — CLONAZEPAM SCH MG: 0.5 TABLET ORAL at 20:57

## 2017-09-28 RX ADMIN — MYCOPHENOLATE MOFETIL SCH MG: 500 TABLET, FILM COATED ORAL at 08:28

## 2017-09-28 RX ADMIN — SIMETHICONE CHEW TAB 80 MG SCH MG: 80 TABLET ORAL at 12:47

## 2017-09-28 RX ADMIN — SIMETHICONE CHEW TAB 80 MG SCH MG: 80 TABLET ORAL at 08:28

## 2017-09-28 NOTE — PN
Subjective


Date of Service: 09/28/17


Interval History: 





pt denies abd pain, continues to have multiple loose BM's: "I go every 15 

minutes"





Objective


Active Medications: 








Hydrocodone Bitart/Acetaminophen (Norco 5-325 Tab*)  1 tab PO QID PRN


   PRN Reason: PAIN


   Last Admin: 09/28/17 12:47 Dose:  1 tab


Baclofen (Lioresal Tab*)  10 mg PO TID PRN


   PRN Reason: SPASMS


Buspirone HCl (Buspar Tab*)  5 mg PO BID [facility]


   Last Admin: 09/28/17 08:28 Dose:  5 mg


Carvedilol (Coreg Tab*)  3.125 mg PO BID [facility]


   Last Admin: 09/28/17 08:28 Dose:  3.125 mg


Clonazepam (Klonopin Tab(*))  0.5 mg PO BEDTIME [facility]


   Last Admin: 09/27/17 21:39 Dose:  0.5 mg


Gabapentin (Neurontin Cap(*))  300 mg PO BEDTIME [facility]


   Last Admin: 09/27/17 21:39 Dose:  300 mg


Ceftriaxone Sodium 1,000 mg/ (Sodium Chloride)  50 mls @ 200 mls/hr IVPB Q24H 

[facility]


   Last Admin: 09/27/17 21:38 Dose:  200 mls/hr


Sodium Chloride (Ns 0.9% 1000 Ml*)  1,000 mls @ 75 mls/hr IV PER RATE [facility]


   Last Admin: 09/28/17 14:01 Dose:  75 mls/hr


Metronidazole (Flagyl Tab*)  500 mg PO TID [facility]


   Last Admin: 09/28/17 14:04 Dose:  500 mg


Mycophenolate Mofetil (Cellcept Tab(*))  500 mg PO BID [facility]


   Last Admin: 09/28/17 08:28 Dose:  500 mg


Paroxetine HCl (Paxil Tab*)  40 mg PO QPM [facility]


Prednisone (Deltasone Tab*)  10 mg PO DAILY [facility]


   Last Admin: 09/28/17 08:27 Dose:  10 mg


Prochlorperazine (Compazine Tab*)  10 mg PO DAILY PRN


   PRN Reason: NAUSEA


Simethicone (Mylicon Tab*)  80 mg PO AC [facility]


   Last Admin: 09/28/17 12:47 Dose:  80 mg








Oxygen Devices in Use Now: None


Appearance: 85 yo f in nAD, aAOx3


Eyes: No Scleral Icterus, PERRLA


Ears/Nose/Mouth/Throat: NL Teeth, Lips, Gums, Mucous Membranes Moist


Neck: NL Appearance and Movements; NL JVP, Trachea Midline


Respiratory: Symmetrical Chest Expansion and Respiratory Effort, Clear to 

Auscultation


Cardiovascular: NL Sounds; No Murmurs; No JVD, RRR


Abdominal: NL Sounds; No Tenderness; No Distention, No Hepatosplenomegaly


Lymphatic: No Cervical Adenopathy


Extremities: No Edema, No Clubbing, Cyanosis


Skin: No Rash or Ulcers, No Nodules or Sclerosis


Neurological: Alert and Oriented x 3, NL Muscle Strength and Tone


Result Diagrams: 


 09/28/17 04:52





 09/28/17 04:52





Assess/Plan/Problems-Billing


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 

appendiceal abscess and bezoar in 2015, tardive dyskinesia, SLE,  presents with 

abd pain x 3 months and diarrhea x 3 days.











- Patient Problems


(1) Diarrhea


Comment: C. diff neg


Pain resolved-was in Left abdomen. Reviewed CT with Dr. Rivera who noted the 

foreign body in the cecum not to be obstructing. It is probably an incidental 

finding. There were also extensive diverticuli.


Suspect the pt has diverticulits that would explain the abd pain (that improved 

with Ceftriaxone ) and diarrhea. Will add flagyl PO to treat suspected 

diverticulitis.   





(2) UTI (urinary tract infection)


Comment: Cx positive for  klebsiella UTI.


cont Cefrtriaxone   





(3) Hypertension


Comment: Continue carvedilol.


controlled   





(4) Infarction of spleen


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 

severeral years ago


cont  coumadin   





(5) Systemic lupus erythematosus


Comment: Continue cellcept and prednisone    





(6) DVT prophylaxis


Comment: Coumadin    


Status and Disposition: 


Due to continuation of diarrhea will place pt on inpatient status

## 2017-09-29 RX ADMIN — MYCOPHENOLATE MOFETIL SCH MG: 500 TABLET, FILM COATED ORAL at 09:30

## 2017-09-29 RX ADMIN — CARVEDILOL SCH MG: 3.12 TABLET, FILM COATED ORAL at 20:09

## 2017-09-29 RX ADMIN — METRONIDAZOLE SCH MG: 250 TABLET ORAL at 09:30

## 2017-09-29 RX ADMIN — BUSPIRONE HYDROCHLORIDE SCH MG: 5 TABLET ORAL at 09:31

## 2017-09-29 RX ADMIN — PREDNISONE SCH MG: 5 TABLET ORAL at 09:31

## 2017-09-29 RX ADMIN — SODIUM CHLORIDE SCH MLS/HR: 900 IRRIGANT IRRIGATION at 04:58

## 2017-09-29 RX ADMIN — METRONIDAZOLE SCH MG: 250 TABLET ORAL at 20:08

## 2017-09-29 RX ADMIN — GABAPENTIN SCH MG: 300 CAPSULE ORAL at 20:08

## 2017-09-29 RX ADMIN — METRONIDAZOLE SCH MG: 250 TABLET ORAL at 15:26

## 2017-09-29 RX ADMIN — SIMETHICONE CHEW TAB 80 MG SCH MG: 80 TABLET ORAL at 17:14

## 2017-09-29 RX ADMIN — CEFTRIAXONE SODIUM SCH MLS/HR: 1 INJECTION, POWDER, FOR SOLUTION INTRAMUSCULAR; INTRAVENOUS at 20:04

## 2017-09-29 RX ADMIN — CLONAZEPAM SCH MG: 0.5 TABLET ORAL at 20:08

## 2017-09-29 RX ADMIN — MYCOPHENOLATE MOFETIL SCH MG: 500 TABLET, FILM COATED ORAL at 20:08

## 2017-09-29 RX ADMIN — CARVEDILOL SCH MG: 3.12 TABLET, FILM COATED ORAL at 09:31

## 2017-09-29 RX ADMIN — BACLOFEN PRN MG: 10 TABLET ORAL at 20:08

## 2017-09-29 RX ADMIN — BUSPIRONE HYDROCHLORIDE SCH MG: 5 TABLET ORAL at 20:09

## 2017-09-29 RX ADMIN — SIMETHICONE CHEW TAB 80 MG SCH MG: 80 TABLET ORAL at 09:30

## 2017-09-29 RX ADMIN — SIMETHICONE CHEW TAB 80 MG SCH MG: 80 TABLET ORAL at 12:21

## 2017-09-29 RX ADMIN — PAROXETINE HYDROCHLORIDE HEMIHYDRATE SCH MG: 40 TABLET, FILM COATED ORAL at 17:14

## 2017-09-29 NOTE — PN
Subjective


Date of Service: 09/29/17


Interval History: 





pt feels much better, stools less frequent, still loose, no abd pain





Objective


Active Medications: 








Hydrocodone Bitart/Acetaminophen (Norco 5-325 Tab*)  1 tab PO QID PRN


   PRN Reason: PAIN


   Last Admin: 09/28/17 12:47 Dose:  1 tab


Baclofen (Lioresal Tab*)  10 mg PO TID PRN


   PRN Reason: SPASMS


   Last Admin: 09/28/17 20:55 Dose:  10 mg


Buspirone HCl (Buspar Tab*)  5 mg PO BID [facility]


   Last Admin: 09/29/17 09:31 Dose:  5 mg


Carvedilol (Coreg Tab*)  3.125 mg PO BID [facility]


   Last Admin: 09/29/17 09:31 Dose:  3.125 mg


Clonazepam (Klonopin Tab(*))  0.5 mg PO BEDTIME [facility]


   Last Admin: 09/28/17 20:57 Dose:  0.5 mg


Gabapentin (Neurontin Cap(*))  300 mg PO BEDTIME [facility]


   Last Admin: 09/28/17 20:57 Dose:  300 mg


Ceftriaxone Sodium 1,000 mg/ (Sodium Chloride)  50 mls @ 200 mls/hr IVPB Q24H 

[facility]


   Last Admin: 09/28/17 20:49 Dose:  200 mls/hr


Metronidazole (Flagyl Tab*)  500 mg PO TID [facility]


   Last Admin: 09/29/17 15:26 Dose:  500 mg


Mycophenolate Mofetil (Cellcept Tab(*))  500 mg PO BID [facility]


   Last Admin: 09/29/17 09:30 Dose:  500 mg


Paroxetine HCl (Paxil Tab*)  40 mg PO QPM [facility]


   Last Admin: 09/28/17 18:02 Dose:  40 mg


Prednisone (Deltasone Tab*)  10 mg PO DAILY [facility]


   Last Admin: 09/29/17 09:31 Dose:  10 mg


Prochlorperazine (Compazine Tab*)  10 mg PO DAILY PRN


   PRN Reason: NAUSEA


Simethicone (Mylicon Tab*)  80 mg PO AC [facility]


   Last Admin: 09/29/17 12:21 Dose:  80 mg








 Vital Signs











  09/28/17 09/28/17 09/28/17





  20:00 20:57 22:28


 


Temperature   97.9 F


 


Pulse Rate   72


 


Respiratory 16 22 16





Rate   


 


Blood Pressure   120/59





(mmHg)   


 


O2 Sat by Pulse   97





Oximetry   














  09/28/17 09/29/17 09/29/17





  22:57 02:35 07:54


 


Temperature  97.5 F 97.8 F


 


Pulse Rate  80 71


 


Respiratory 16 16 18





Rate   


 


Blood Pressure  140/65 124/75





(mmHg)   


 


O2 Sat by Pulse  98 98





Oximetry   














  09/29/17 09/29/17 09/29/17





  07:56 08:00 11:11


 


Temperature 97.8 F  98.6 F


 


Pulse Rate 71  80


 


Respiratory 18 18 





Rate   


 


Blood Pressure 124/75  119/62





(mmHg)   


 


O2 Sat by Pulse 98  100





Oximetry   














  09/29/17 09/29/17





  11:15 15:26


 


Temperature 98.6 F 98.6 F


 


Pulse Rate 80 89


 


Respiratory 15 20





Rate  


 


Blood Pressure 119/62 135/71





(mmHg)  


 


O2 Sat by Pulse 100 95





Oximetry  











Oxygen Devices in Use Now: None


Appearance: 85 yo f in nAD, AAOx3, frequent lip smacking due to tardive 

dyskinesis


Eyes: No Scleral Icterus, PERRLA


Ears/Nose/Mouth/Throat: NL Teeth, Lips, Gums


Neck: NL Appearance and Movements; NL JVP, Trachea Midline


Respiratory: Symmetrical Chest Expansion and Respiratory Effort, Clear to 

Auscultation


Cardiovascular: NL Sounds; No Murmurs; No JVD, RRR


Abdominal: NL Sounds; No Tenderness; No Distention, No Hepatosplenomegaly


Lymphatic: No Cervical Adenopathy


Extremities: No Edema, No Clubbing, Cyanosis


Skin: No Rash or Ulcers, No Nodules or Sclerosis


Neurological: Alert and Oriented x 3, NL Muscle Strength and Tone


Result Diagrams: 


 09/28/17 04:52





 09/28/17 04:52


Additional Lab and Data: 


 Lab Results











  09/27/17 09/27/17 09/27/17 Range/Units





  14:13 14:13 14:13 


 


WBC   11.1 H   (3.5-10.8)  10^3/ul


 


RBC   4.72   (4.0-5.4)  10^6/ul


 


Hgb   14.2   (12.0-16.0)  g/dl


 


Hct   44   (35-47)  %


 


MCV   93   (80-97)  fL


 


MCH   30   (27-31)  pg


 


MCHC   32   (31-36)  g/dl


 


RDW   15   (10.5-15)  %


 


Plt Count   315   (150-450)  10^3/ul


 


MPV   7 L   (7.4-10.4)  um3


 


Immature Gran % (Auto)   1   (0-9)  %


 


Neut % (Auto)   91.1 H   (38-83)  %


 


Lymph % (Auto)   7.2 L   (25-47)  %


 


Mono % (Auto)   1.2   (1-9)  %


 


Eos % (Auto)   0.1   (0-6)  %


 


Baso % (Auto)   0.4   (0-2)  %


 


Absolute Neuts (auto)   10.1 H   (1.5-7.7)  10^3/ul


 


Absolute Lymphs (auto)   0.8 L   (1.0-4.8)  10^3/ul


 


Absolute Monos (auto)   0.1   (0-0.8)  10^3/ul


 


Absolute Eos (auto)   0   (0-0.6)  10^3/ul


 


Absolute Basos (auto)   0   (0-0.2)  10^3/ul


 


Absolute Nucleated RBC   0   10^3/ul


 


Neutrophils %   94 H   (38-83)  %


 


Lymphocytes %   4 L   (25-47)  %


 


Monocytes %   1   (0-13)  %


 


Myelocytes %   1   (0-1)  %


 


Nucleated RBC %   0   


 


Normal RBC Morphology   Normal   (Normal)  


 


INR (Anticoag Therapy)    1.96 H  (0.89-1.11)  


 


D-Dimer, Quantitative    < 200  (Less Than 230)  ng/mL


 


Sodium  136    (133-145)  mmol/L


 


Potassium  4.4    (3.5-5.0)  mmol/L


 


Chloride  103    (101-111)  mmol/L


 


Carbon Dioxide  26    (22-32)  mmol/L


 


Anion Gap  7    (2-11)  mmol/L


 


BUN  17    (6-24)  mg/dL


 


Creatinine  0.76    (0.51-0.95)  mg/dL


 


Est GFR ( Amer)  92.8    (>60)  


 


Est GFR (Non-Af Amer)  72.2    (>60)  


 


BUN/Creatinine Ratio  22.4 H    (8-20)  


 


Glucose  119 H    ()  mg/dL


 


Calcium  8.8    (8.6-10.3)  mg/dL


 


Total Bilirubin  0.40    (0.2-1.0)  mg/dL


 


AST  13    (13-39)  U/L


 


ALT  10    (7-52)  U/L


 


Alkaline Phosphatase  49    ()  U/L


 


Troponin I  0.00    (<0.04)  ng/mL


 


C-Reactive Protein  7.04 H    (< 5.00)  mg/L


 


Total Protein  5.9 L    (6.4-8.9)  g/dL


 


Albumin  3.5    (3.2-5.2)  g/dL


 


Globulin  2.4    (2-4)  g/dL


 


Albumin/Globulin Ratio  1.5    (1-3)  


 


Lipase  26    (11.0-82.0)  U/L


 


TSH  0.93    (0.34-5.60)  mcIU/mL


 


Urine Color     


 


Urine Appearance     


 


Urine pH     (5-9)  


 


Ur Specific Gravity     (1.010-1.030)  


 


Urine Protein     (Negative)  


 


Urine Ketones     (Negative)  


 


Urine Blood     (Negative)  


 


Urine Nitrate     (Negative)  


 


Urine Bilirubin     (Negative)  


 


Urine Urobilinogen     (Negative)  


 


Ur Leukocyte Esterase     (Negative)  


 


Urine WBC (Auto)     (Absent)  


 


Urine RBC (Auto)     (Absent)  


 


Ur Squamous Epith Cells     (Absent)  


 


Urine Bacteria     (Absent)  


 


Urine Glucose     (Negative)  














  09/27/17 09/28/17 09/28/17 Range/Units





  14:13 04:51 04:52 


 


WBC    9.6  (3.5-10.8)  10^3/ul


 


RBC    4.22  (4.0-5.4)  10^6/ul


 


Hgb    13.2  (12.0-16.0)  g/dl


 


Hct    40  (35-47)  %


 


MCV    94  (80-97)  fL


 


MCH    31  (27-31)  pg


 


MCHC    33  (31-36)  g/dl


 


RDW    15  (10.5-15)  %


 


Plt Count    285  (150-450)  10^3/ul


 


MPV    7 L  (7.4-10.4)  um3


 


Immature Gran % (Auto)     (0-9)  %


 


Neut % (Auto)    67.0  (38-83)  %


 


Lymph % (Auto)    23.9 L  (25-47)  %


 


Mono % (Auto)    8.0  (1-9)  %


 


Eos % (Auto)    0.6  (0-6)  %


 


Baso % (Auto)    0.5  (0-2)  %


 


Absolute Neuts (auto)    6.4  (1.5-7.7)  10^3/ul


 


Absolute Lymphs (auto)    2.3  (1.0-4.8)  10^3/ul


 


Absolute Monos (auto)    0.8  (0-0.8)  10^3/ul


 


Absolute Eos (auto)    0.1  (0-0.6)  10^3/ul


 


Absolute Basos (auto)    0  (0-0.2)  10^3/ul


 


Absolute Nucleated RBC    0.01  10^3/ul


 


Neutrophils %     (38-83)  %


 


Lymphocytes %     (25-47)  %


 


Monocytes %     (0-13)  %


 


Myelocytes %     (0-1)  %


 


Nucleated RBC %    0.1  


 


Normal RBC Morphology     (Normal)  


 


INR (Anticoag Therapy)   2.10 H   (0.89-1.11)  


 


D-Dimer, Quantitative     (Less Than 230)  ng/mL


 


Sodium     (133-145)  mmol/L


 


Potassium     (3.5-5.0)  mmol/L


 


Chloride     (101-111)  mmol/L


 


Carbon Dioxide     (22-32)  mmol/L


 


Anion Gap     (2-11)  mmol/L


 


BUN     (6-24)  mg/dL


 


Creatinine     (0.51-0.95)  mg/dL


 


Est GFR ( Amer)     (>60)  


 


Est GFR (Non-Af Amer)     (>60)  


 


BUN/Creatinine Ratio     (8-20)  


 


Glucose     ()  mg/dL


 


Calcium     (8.6-10.3)  mg/dL


 


Total Bilirubin     (0.2-1.0)  mg/dL


 


AST     (13-39)  U/L


 


ALT     (7-52)  U/L


 


Alkaline Phosphatase     ()  U/L


 


Troponin I     (<0.04)  ng/mL


 


C-Reactive Protein     (< 5.00)  mg/L


 


Total Protein     (6.4-8.9)  g/dL


 


Albumin     (3.2-5.2)  g/dL


 


Globulin     (2-4)  g/dL


 


Albumin/Globulin Ratio     (1-3)  


 


Lipase     (11.0-82.0)  U/L


 


TSH     (0.34-5.60)  mcIU/mL


 


Urine Color  Yellow    


 


Urine Appearance  Cloudy    


 


Urine pH  7.0    (5-9)  


 


Ur Specific Gravity  1.005 L    (1.010-1.030)  


 


Urine Protein  Negative    (Negative)  


 


Urine Ketones  Negative    (Negative)  


 


Urine Blood  2+ H    (Negative)  


 


Urine Nitrate  Negative    (Negative)  


 


Urine Bilirubin  Negative    (Negative)  


 


Urine Urobilinogen  Negative    (Negative)  


 


Ur Leukocyte Esterase  3+ H    (Negative)  


 


Urine WBC (Auto)  3+(>20/hpf) H    (Absent)  


 


Urine RBC (Auto)  Trace(0-2/hpf)    (Absent)  


 


Ur Squamous Epith Cells  Present H    (Absent)  


 


Urine Bacteria  2+ H    (Absent)  


 


Urine Glucose  Negative    (Negative)  














  09/28/17 Range/Units





  04:52 


 


WBC   (3.5-10.8)  10^3/ul


 


RBC   (4.0-5.4)  10^6/ul


 


Hgb   (12.0-16.0)  g/dl


 


Hct   (35-47)  %


 


MCV   (80-97)  fL


 


MCH   (27-31)  pg


 


MCHC   (31-36)  g/dl


 


RDW   (10.5-15)  %


 


Plt Count   (150-450)  10^3/ul


 


MPV   (7.4-10.4)  um3


 


Immature Gran % (Auto)   (0-9)  %


 


Neut % (Auto)   (38-83)  %


 


Lymph % (Auto)   (25-47)  %


 


Mono % (Auto)   (1-9)  %


 


Eos % (Auto)   (0-6)  %


 


Baso % (Auto)   (0-2)  %


 


Absolute Neuts (auto)   (1.5-7.7)  10^3/ul


 


Absolute Lymphs (auto)   (1.0-4.8)  10^3/ul


 


Absolute Monos (auto)   (0-0.8)  10^3/ul


 


Absolute Eos (auto)   (0-0.6)  10^3/ul


 


Absolute Basos (auto)   (0-0.2)  10^3/ul


 


Absolute Nucleated RBC   10^3/ul


 


Neutrophils %   (38-83)  %


 


Lymphocytes %   (25-47)  %


 


Monocytes %   (0-13)  %


 


Myelocytes %   (0-1)  %


 


Nucleated RBC %   


 


Normal RBC Morphology   (Normal)  


 


INR (Anticoag Therapy)   (0.89-1.11)  


 


D-Dimer, Quantitative   (Less Than 230)  ng/mL


 


Sodium  139  (133-145)  mmol/L


 


Potassium  3.6  (3.5-5.0)  mmol/L


 


Chloride  107  (101-111)  mmol/L


 


Carbon Dioxide  25  (22-32)  mmol/L


 


Anion Gap  7  (2-11)  mmol/L


 


BUN  12  (6-24)  mg/dL


 


Creatinine  0.72  (0.51-0.95)  mg/dL


 


Est GFR ( Amer)  98.8  (>60)  


 


Est GFR (Non-Af Amer)  76.8  (>60)  


 


BUN/Creatinine Ratio  16.7  (8-20)  


 


Glucose  87  ()  mg/dL


 


Calcium  8.4 L  (8.6-10.3)  mg/dL


 


Total Bilirubin   (0.2-1.0)  mg/dL


 


AST   (13-39)  U/L


 


ALT   (7-52)  U/L


 


Alkaline Phosphatase   ()  U/L


 


Troponin I   (<0.04)  ng/mL


 


C-Reactive Protein   (< 5.00)  mg/L


 


Total Protein   (6.4-8.9)  g/dL


 


Albumin   (3.2-5.2)  g/dL


 


Globulin   (2-4)  g/dL


 


Albumin/Globulin Ratio   (1-3)  


 


Lipase   (11.0-82.0)  U/L


 


TSH   (0.34-5.60)  mcIU/mL


 


Urine Color   


 


Urine Appearance   


 


Urine pH   (5-9)  


 


Ur Specific Gravity   (1.010-1.030)  


 


Urine Protein   (Negative)  


 


Urine Ketones   (Negative)  


 


Urine Blood   (Negative)  


 


Urine Nitrate   (Negative)  


 


Urine Bilirubin   (Negative)  


 


Urine Urobilinogen   (Negative)  


 


Ur Leukocyte Esterase   (Negative)  


 


Urine WBC (Auto)   (Absent)  


 


Urine RBC (Auto)   (Absent)  


 


Ur Squamous Epith Cells   (Absent)  


 


Urine Bacteria   (Absent)  


 


Urine Glucose   (Negative)  














Assess/Plan/Problems-Billing


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 

appendiceal abscess and bezoar in 2015, tardive dyskinesia, SLE,  presents with 

abd pain x 3 months and diarrhea x 3 days.











- Patient Problems


(1) Diarrhea


Comment: C. diff neg


Pain resolved-was in Left abdomen. Reviewed CT with Dr. Rivera who noted the 

foreign body in the cecum not to be obstructing. It is probably an incidental 

finding. There were also extensive diverticuli.


pt is treated with Ceftriaxone and Flagyl  for suspected acute diverticulitis-

symptoms improving, plan to d/c in AM   





(2) UTI (urinary tract infection)


Comment: Cx positive for  klebsiella UTI.


cont Ceftriaxone   





(3) Hypertension


Comment: Continue carvedilol.


controlled   





(4) Infarction of spleen


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 

severeral years ago


cont  coumadin   





(5) Systemic lupus erythematosus


Comment: Continue cellcept and prednisone    





(6) DVT prophylaxis


Comment: Coumadin    


Status and Disposition: 


 inpatient

## 2017-09-29 NOTE — ED
Elkin HEREDIA Benjamin, scribed for Ad Martinez MD on 17 at 1409 .





Abdominal Pain/Female





- HPI Summary


HPI Summary: 





87yo female c/o sharp left sided abdominal pain.She has been having this pain 

for about a month.  Pt saw her PCP yesterday for her symptom and was supposed 

to get a CT scan yesterday at the hospital, but pt states that she couldnt 

make it to the hospital due to her abdominal pain. Pt also reports diffuse 

weakness that is worse in her legs. Pt states that breathing and coughing 

aggravates her abdominal pain. Pt also reports leg pain when walking. Denies 

SOB. Pt reports bowel incontinence for about a moth.





- History of Current Complaint


Chief Complaint: EDAbdPain


Stated Complaint: ABD PAIN


Time Seen by Provider: 17 13:28


Hx Obtained From: Patient


Pregnant?: No


Onset/Duration: Gradual Onset, Lasting Weeks - 4 weeks, Still Present


Timing: Constant


Severity Initially: Moderate


Severity Currently: Moderate


Pain Intensity: 9


Pain Scale Used: 0-10 Numeric


Location: Diffuse - diffuse left abdomen


Radiates: No


Character: Sharp


Aggravating Factor(s): Deep Breaths, Other: - coughing


Alleviating Factor(s): Nothing


Associated Signs and Symptoms: Positive: Other: - bowel incontinence; weakness


Allergies/Adverse Reactions: 


 Allergies











Allergy/AdvReac Type Severity Reaction Status Date / Time


 


Penicillins [PCN] Allergy Intermediate Rash Verified 16 11:22











Home Medications: 


 Home Medications





Alendronate (NF) [Fosamax (NF)] 70 mg PO WEEKLY 17 [History Confirmed ]


Baclofen TAB* [Lioresal TAB*] 10 mg PO TID PRN 17 [History Confirmed ]


Carvedilol TAB* [Coreg TAB*] 3.125 mg PO BID 17 [History Confirmed ]


Warfarin TAB(*) [Coumadin TAB(*)] 2.5 mg PO DAILY 17 [History Confirmed ]


busPIRone TAB* [Buspar TAB*] 5 mg PO BID 17 [History Confirmed 17]











PMH/Surg Hx/FS Hx/Imm Hx


Endocrine/Hematology History: Reports: Hx Systemic Lupus Erythematosus, Other 

Endocrine/Hematological Disorders - LUPUS


   Denies: Hx Anticoagulant Therapy, Hx Blood Disorders, Hx Blood Transfusions, 

Hx Bone Marrow Disease, Hx Diabetes, Hx Sickle Cell Disease, Hx Thyroid Disease

, Hx Anemia, Hx Unexplained Bleeding


Cardiovascular History: Reports: Hx Deep Vein Thrombosis, Hx Hypertension, 

Other Cardiovascular Problems/Disorders - LUPUS


   Denies: Hx Congestive Heart Failure, Hx Pacemaker/ICD


Respiratory History: 


   Denies: Hx Asthma, Hx Chronic Obstructive Pulmonary Disease (COPD)


GI History: Reports: Hx Gastroesophageal Reflux Disease, Hx Irritable Bowel, 

Other GI Disorders - GERD


   Denies: Hx Cirrhosis, Hx Crohn's Disease, Hx Diverticulosis, Hx Gall Bladder 

Disease, Hx Gastrointestinal Bleed, Hx Hiatal Hernia, Hx Jaundice, Hx 

Obstructive Bowel, Hx Ileostomy, Hx Pyloric Stenosis, Hx Ulcer


 History: Reports: Other  Problems/Disorders - UTIs several x's a yr


   Denies: Hx Dialysis, Hx Renal Disease


Musculoskeletal History: Reports: Hx Arthritis, Hx Back Problems, Hx 

Fibromyalgia, Other Musculoskeletal History - DJD-NECK, FIBROMYALGIA, 

RHEUMATOID ARTHRITIS


   Denies: Hx Rheumatoid Arthritis, Hx Bursitis, Hx Congenital Bone 

Abnormalities, Hx Gout, Hx Orthopedic Injury, Hx Osteoporosis, Hx Scoliosis, Hx 

Tendonitis


Sensory History: Reports: Hx Cataracts, Hx Contacts or Glasses, Hx Vision 

Problem


   Denies: Hx Eye Injury, Hx Eye Prosthesis, Hx Glaucoma, Hx Macular 

Degeneration, Hx Deafness, Hx Hearing Aid, Hx Hearing Problem, Other Sensory 

Impairments


Opthamlomology History: Reports: Hx Cataracts, Hx Contacts or Glasses, Hx 

Vision Problem


   Denies: Hx Eye Injury, Hx Eye Prosthesis, Hx Glaucoma, Hx Macular 

Degeneration, Other Sensory Impairments


Neurological History: 


   Denies: Hx Dementia, Hx Seizures


Psychiatric History: Reports: Hx Anxiety - LUPUS RELATED, Hx Eating Disorder, 

Hx Depression - LUPUS RELATED


   Denies: Hx Attention Deficit Hyperactivity Disorder, Hx Panic Disorder, Hx 

Post Traumatic Stress Disorder, Hx Inpatient Treatment, Hx Schizophrenia, Hx 

Bipolar Disorder, Hx Suicide Attempt, Hx Substance Abuse, Other Psychiatric 

Issues/Disorders





- Cancer History


Hx Chemotherapy: No





- Surgical History


Surgery Procedure, Year, and Place: .  APPENDECTOMY 40 YEARS AGO (?)


Hx Anesthesia Reactions: No


Infectious Disease History: No


Infectious Disease History: 


   Denies: Hx Clostridium Difficile, Hx Hepatitis, Hx Human Immunodeficiency 

Virus (HIV), Hx of Known/Suspected MRSA, Hx Shingles, Hx Tuberculosis, Traveled 

Outside the [place] in Last 30 Days





- Family History


Known Family History: 


   Negative: Hypertension





- Social History


Occupation: Retired


Alcohol Use: None


Hx Substance Use: No


Substance Use Type: Reports: None


Hx Tobacco Use: Yes


Smoking Status (MU): Former Smoker


Type: Cigarettes


Amount Used/How Often: 40 years


Have You Smoked in the Last Year: No





Review of Systems


Constitutional: Negative


Eyes: Negative


ENT: Negative


Cardiovascular: Negative


Respiratory: Negative


Positive: Abdominal Pain, Other - bowel incontinence


Genitourinary: Negative


Positive: no symptoms reported


Positive: Arthralgia - leg pain, Myalgia - leg pain


Skin: Negative


Positive: Weakness - generalized 


Psychological: Normal


All Other Systems Reviewed And Are Negative: Yes





Physical Exam


Triage Information Reviewed: Yes


Vital Signs On Initial Exam: 


 Initial Vitals











BP


 


 135/88 


 


 17 13:26











Vital Signs Reviewed: Yes


Appearance: Positive: Well-Appearing, No Pain Distress, Well-Nourished


Skin: Positive: Warm, Skin Color Reflects Adequate Perfusion, Dry


Head/Face: Positive: Normal Head/Face Inspection


Eyes: Positive: EOMI, LYNN


ENT: Positive: Hearing grossly normal, Pharynx normal


Neck: Positive: Supple, Nontender


Respiratory/Lung Sounds: Positive: Clear to Auscultation, Breath Sounds Present


Cardiovascular: Positive: RRR, Pulses are Symmetrical in both Upper and Lower 

Extremities


Abdomen Description: Positive: Nontender - no reproducible tenderness, Soft


Bowel Sounds: Positive: Present


Musculoskeletal: Positive: Strength/ROM Intact


Neurological: Positive: Sensory/Motor Intact, Alert, Oriented to Person Place, 

Time


Psychiatric: Positive: Affect/Mood Appropriate





- Danielle Coma Scale


Coma Scale Total: 15





Diagnostics





- Vital Signs


 Vital Signs











  Temp Pulse Resp BP Pulse Ox


 


 17 13:46  97.7 F  108  20  132/76  93


 


 17 13:30   108  19  132/76  96


 


 17 13:29   110  19   96


 


 17 13:26     135/88 














- Laboratory


Lab Results: 


 Lab Results











  17 Range/Units





  14:13 14:13 14:13 


 


WBC   11.1 H   (3.5-10.8)  10^3/ul


 


RBC   4.72   (4.0-5.4)  10^6/ul


 


Hgb   14.2   (12.0-16.0)  g/dl


 


Hct   44   (35-47)  %


 


MCV   93   (80-97)  fL


 


MCH   30   (27-31)  pg


 


MCHC   32   (31-36)  g/dl


 


RDW   15   (10.5-15)  %


 


Plt Count   315   (150-450)  10^3/ul


 


MPV   7 L   (7.4-10.4)  um3


 


Immature Gran % (Auto)   1   (0-9)  %


 


Neut % (Auto)   91.1 H   (38-83)  %


 


Lymph % (Auto)   7.2 L   (25-47)  %


 


Mono % (Auto)   1.2   (1-9)  %


 


Eos % (Auto)   0.1   (0-6)  %


 


Baso % (Auto)   0.4   (0-2)  %


 


Absolute Neuts (auto)   10.1 H   (1.5-7.7)  10^3/ul


 


Absolute Lymphs (auto)   0.8 L   (1.0-4.8)  10^3/ul


 


Absolute Monos (auto)   0.1   (0-0.8)  10^3/ul


 


Absolute Eos (auto)   0   (0-0.6)  10^3/ul


 


Absolute Basos (auto)   0   (0-0.2)  10^3/ul


 


Absolute Nucleated RBC   0   10^3/ul


 


Neutrophils %   94 H   (38-83)  %


 


Lymphocytes %   4 L   (25-47)  %


 


Monocytes %   1   (0-13)  %


 


Myelocytes %   1   (0-1)  %


 


Nucleated RBC %   0   


 


Normal RBC Morphology   Normal   (Normal)  


 


INR (Anticoag Therapy)    1.96 H  (0.89-1.11)  


 


D-Dimer, Quantitative    < 200  (Less Than 230)  ng/mL


 


Sodium  136    (133-145)  mmol/L


 


Potassium  4.4    (3.5-5.0)  mmol/L


 


Chloride  103    (101-111)  mmol/L


 


Carbon Dioxide  26    (22-32)  mmol/L


 


Anion Gap  7    (2-11)  mmol/L


 


BUN  17    (6-24)  mg/dL


 


Creatinine  0.76    (0.51-0.95)  mg/dL


 


Est GFR ( Amer)  92.8    (>60)  


 


Est GFR (Non-Af Amer)  72.2    (>60)  


 


BUN/Creatinine Ratio  22.4 H    (8-20)  


 


Glucose  119 H    ()  mg/dL


 


Calcium  8.8    (8.6-10.3)  mg/dL


 


Total Bilirubin  0.40    (0.2-1.0)  mg/dL


 


AST  13    (13-39)  U/L


 


ALT  10    (7-52)  U/L


 


Alkaline Phosphatase  49    ()  U/L


 


Troponin I  0.00    (<0.04)  ng/mL


 


C-Reactive Protein  7.04 H    (< 5.00)  mg/L


 


Total Protein  5.9 L    (6.4-8.9)  g/dL


 


Albumin  3.5    (3.2-5.2)  g/dL


 


Globulin  2.4    (2-4)  g/dL


 


Albumin/Globulin Ratio  1.5    (1-3)  


 


Lipase  26    (11.0-82.0)  U/L


 


TSH  0.93    (0.34-5.60)  mcIU/mL


 


Urine Color     


 


Urine Appearance     


 


Urine pH     (5-9)  


 


Ur Specific Gravity     (1.010-1.030)  


 


Urine Protein     (Negative)  


 


Urine Ketones     (Negative)  


 


Urine Blood     (Negative)  


 


Urine Nitrate     (Negative)  


 


Urine Bilirubin     (Negative)  


 


Urine Urobilinogen     (Negative)  


 


Ur Leukocyte Esterase     (Negative)  


 


Urine WBC (Auto)     (Absent)  


 


Urine RBC (Auto)     (Absent)  


 


Ur Squamous Epith Cells     (Absent)  


 


Urine Bacteria     (Absent)  


 


Urine Glucose     (Negative)  














  17 Range/Units





  14:13 04:51 04:52 


 


WBC    9.6  (3.5-10.8)  10^3/ul


 


RBC    4.22  (4.0-5.4)  10^6/ul


 


Hgb    13.2  (12.0-16.0)  g/dl


 


Hct    40  (35-47)  %


 


MCV    94  (80-97)  fL


 


MCH    31  (27-31)  pg


 


MCHC    33  (31-36)  g/dl


 


RDW    15  (10.5-15)  %


 


Plt Count    285  (150-450)  10^3/ul


 


MPV    7 L  (7.4-10.4)  um3


 


Immature Gran % (Auto)     (0-9)  %


 


Neut % (Auto)    67.0  (38-83)  %


 


Lymph % (Auto)    23.9 L  (25-47)  %


 


Mono % (Auto)    8.0  (1-9)  %


 


Eos % (Auto)    0.6  (0-6)  %


 


Baso % (Auto)    0.5  (0-2)  %


 


Absolute Neuts (auto)    6.4  (1.5-7.7)  10^3/ul


 


Absolute Lymphs (auto)    2.3  (1.0-4.8)  10^3/ul


 


Absolute Monos (auto)    0.8  (0-0.8)  10^3/ul


 


Absolute Eos (auto)    0.1  (0-0.6)  10^3/ul


 


Absolute Basos (auto)    0  (0-0.2)  10^3/ul


 


Absolute Nucleated RBC    0.01  10^3/ul


 


Neutrophils %     (38-83)  %


 


Lymphocytes %     (25-47)  %


 


Monocytes %     (0-13)  %


 


Myelocytes %     (0-1)  %


 


Nucleated RBC %    0.1  


 


Normal RBC Morphology     (Normal)  


 


INR (Anticoag Therapy)   2.10 H   (0.89-1.11)  


 


D-Dimer, Quantitative     (Less Than 230)  ng/mL


 


Sodium     (133-145)  mmol/L


 


Potassium     (3.5-5.0)  mmol/L


 


Chloride     (101-111)  mmol/L


 


Carbon Dioxide     (22-32)  mmol/L


 


Anion Gap     (2-11)  mmol/L


 


BUN     (6-24)  mg/dL


 


Creatinine     (0.51-0.95)  mg/dL


 


Est GFR ( Amer)     (>60)  


 


Est GFR (Non-Af Amer)     (>60)  


 


BUN/Creatinine Ratio     (8-20)  


 


Glucose     ()  mg/dL


 


Calcium     (8.6-10.3)  mg/dL


 


Total Bilirubin     (0.2-1.0)  mg/dL


 


AST     (13-39)  U/L


 


ALT     (7-52)  U/L


 


Alkaline Phosphatase     ()  U/L


 


Troponin I     (<0.04)  ng/mL


 


C-Reactive Protein     (< 5.00)  mg/L


 


Total Protein     (6.4-8.9)  g/dL


 


Albumin     (3.2-5.2)  g/dL


 


Globulin     (2-4)  g/dL


 


Albumin/Globulin Ratio     (1-3)  


 


Lipase     (11.0-82.0)  U/L


 


TSH     (0.34-5.60)  mcIU/mL


 


Urine Color  Yellow    


 


Urine Appearance  Cloudy    


 


Urine pH  7.0    (5-9)  


 


Ur Specific Gravity  1.005 L    (1.010-1.030)  


 


Urine Protein  Negative    (Negative)  


 


Urine Ketones  Negative    (Negative)  


 


Urine Blood  2+ H    (Negative)  


 


Urine Nitrate  Negative    (Negative)  


 


Urine Bilirubin  Negative    (Negative)  


 


Urine Urobilinogen  Negative    (Negative)  


 


Ur Leukocyte Esterase  3+ H    (Negative)  


 


Urine WBC (Auto)  3+(>20/hpf) H    (Absent)  


 


Urine RBC (Auto)  Trace(0-2/hpf)    (Absent)  


 


Ur Squamous Epith Cells  Present H    (Absent)  


 


Urine Bacteria  2+ H    (Absent)  


 


Urine Glucose  Negative    (Negative)  














  17 Range/Units





  04:52 


 


WBC   (3.5-10.8)  10^3/ul


 


RBC   (4.0-5.4)  10^6/ul


 


Hgb   (12.0-16.0)  g/dl


 


Hct   (35-47)  %


 


MCV   (80-97)  fL


 


MCH   (27-31)  pg


 


MCHC   (31-36)  g/dl


 


RDW   (10.5-15)  %


 


Plt Count   (150-450)  10^3/ul


 


MPV   (7.4-10.4)  um3


 


Immature Gran % (Auto)   (0-9)  %


 


Neut % (Auto)   (38-83)  %


 


Lymph % (Auto)   (25-47)  %


 


Mono % (Auto)   (1-9)  %


 


Eos % (Auto)   (0-6)  %


 


Baso % (Auto)   (0-2)  %


 


Absolute Neuts (auto)   (1.5-7.7)  10^3/ul


 


Absolute Lymphs (auto)   (1.0-4.8)  10^3/ul


 


Absolute Monos (auto)   (0-0.8)  10^3/ul


 


Absolute Eos (auto)   (0-0.6)  10^3/ul


 


Absolute Basos (auto)   (0-0.2)  10^3/ul


 


Absolute Nucleated RBC   10^3/ul


 


Neutrophils %   (38-83)  %


 


Lymphocytes %   (25-47)  %


 


Monocytes %   (0-13)  %


 


Myelocytes %   (0-1)  %


 


Nucleated RBC %   


 


Normal RBC Morphology   (Normal)  


 


INR (Anticoag Therapy)   (0.89-1.11)  


 


D-Dimer, Quantitative   (Less Than 230)  ng/mL


 


Sodium  139  (133-145)  mmol/L


 


Potassium  3.6  (3.5-5.0)  mmol/L


 


Chloride  107  (101-111)  mmol/L


 


Carbon Dioxide  25  (22-32)  mmol/L


 


Anion Gap  7  (2-11)  mmol/L


 


BUN  12  (6-24)  mg/dL


 


Creatinine  0.72  (0.51-0.95)  mg/dL


 


Est GFR ( Amer)  98.8  (>60)  


 


Est GFR (Non-Af Amer)  76.8  (>60)  


 


BUN/Creatinine Ratio  16.7  (8-20)  


 


Glucose  87  ()  mg/dL


 


Calcium  8.4 L  (8.6-10.3)  mg/dL


 


Total Bilirubin   (0.2-1.0)  mg/dL


 


AST   (13-39)  U/L


 


ALT   (7-52)  U/L


 


Alkaline Phosphatase   ()  U/L


 


Troponin I   (<0.04)  ng/mL


 


C-Reactive Protein   (< 5.00)  mg/L


 


Total Protein   (6.4-8.9)  g/dL


 


Albumin   (3.2-5.2)  g/dL


 


Globulin   (2-4)  g/dL


 


Albumin/Globulin Ratio   (1-3)  


 


Lipase   (11.0-82.0)  U/L


 


TSH   (0.34-5.60)  mcIU/mL


 


Urine Color   


 


Urine Appearance   


 


Urine pH   (5-9)  


 


Ur Specific Gravity   (1.010-1.030)  


 


Urine Protein   (Negative)  


 


Urine Ketones   (Negative)  


 


Urine Blood   (Negative)  


 


Urine Nitrate   (Negative)  


 


Urine Bilirubin   (Negative)  


 


Urine Urobilinogen   (Negative)  


 


Ur Leukocyte Esterase   (Negative)  


 


Urine WBC (Auto)   (Absent)  


 


Urine RBC (Auto)   (Absent)  


 


Ur Squamous Epith Cells   (Absent)  


 


Urine Bacteria   (Absent)  


 


Urine Glucose   (Negative)  











Result Diagrams: 


 17 04:52





 17 04:52


Lab Statement: Any lab studies that have been ordered have been reviewed, and 

results considered in the medical decision making process.





- Radiology


  ** CXR


Xray Interpretation: Positive (See Comments) - IMPRESSION: HYPERINFLATION, 

CONSISTENT WITH COPD. NO ACTIVE CARDIOPULMONARY DISEASE.


Radiology Interpretation Completed By: Radiologist - ED physician has reviewed 

the radiology report and agrees with the findings.





- CT


  ** CT A/P W


CT Interpretation: Positive (See Comments) - IMPRESSION: 1.  Small pericardial 

effusion at the base the heart. 2.  Mild nonspecific enhancement gallbladder 

wall. 3.  Stable low-density splenic lesion. 4.  Numerous large left renal 

cysts and small right renal cyst with peripheral calcifications. There is 

stability. 5.  Suspect ingested foreign body residing within the cecum. This is 

likely an incidental finding. 6.  Scattered diverticula of the sigmoid and 

descending colon. 7.  Uterine leiomyomas


CT Interpretation Completed By: Radiologist - ED physician has reviewed the 

radiology report and agrees with the findings.





Abdominal Pain Fem Course/Dx





- Course


Course Of Treatment: Reviewed pts list of medications and allergies. Blood 

pressure noted.  Discussed with Dr. Bocanegra (hospitalist) at 1737 hour. Ms. Golden has had abdominal pain for a month and is now C/O debilitating 

weakness.  She has a UTI and we gave her fluids and antibiotics.  Dr. Bocanegra 

was consulted for admission.





- Diagnoses


Provider Diagnoses: 


 Weakness, UTI (urinary tract infection)








Discharge





- Discharge Plan


Condition: Stable


Disposition: ADMITTED TO [facility] documentation as recorded by the Elkin yi Benjamin accurately reflects 

the service I personally performed and the decisions made by me, Ad Martinez MD.

## 2017-09-30 VITALS — SYSTOLIC BLOOD PRESSURE: 130 MMHG | DIASTOLIC BLOOD PRESSURE: 61 MMHG

## 2017-09-30 RX ADMIN — MYCOPHENOLATE MOFETIL SCH MG: 500 TABLET, FILM COATED ORAL at 09:35

## 2017-09-30 RX ADMIN — SIMETHICONE CHEW TAB 80 MG SCH MG: 80 TABLET ORAL at 09:35

## 2017-09-30 RX ADMIN — BUSPIRONE HYDROCHLORIDE SCH MG: 5 TABLET ORAL at 09:35

## 2017-09-30 RX ADMIN — PREDNISONE SCH MG: 5 TABLET ORAL at 09:35

## 2017-09-30 RX ADMIN — CARVEDILOL SCH MG: 3.12 TABLET, FILM COATED ORAL at 09:35

## 2017-09-30 RX ADMIN — METRONIDAZOLE SCH MG: 250 TABLET ORAL at 09:35

## 2017-09-30 RX ADMIN — METRONIDAZOLE SCH MG: 250 TABLET ORAL at 15:19

## 2017-09-30 RX ADMIN — SIMETHICONE CHEW TAB 80 MG SCH MG: 80 TABLET ORAL at 11:56

## 2017-09-30 NOTE — DS
CC:  Dr. Sapp *

 

DISCHARGE SUMMARY:

 

DATE OF ADMISSION:  09/27/17

 

DATE OF DISCHARGE:  09/30/17

 

PRIMARY CARE PROVIDER:  Dr. Sapp.

 

DISCHARGE DIAGNOSES:

1.  Abdominal pain and diarrhea, possible mild acute diverticulitis.

2.  Klebsiella pneumoniae urinary tract infection.

 

SECONDARY DIAGNOSES:

1.  History of systemic lupus erythematosus, on CellCept and prednisone.

2.  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

3.  History of anxiety and depression.

4.  History of chronic pain due to T8 compression fracture and scoliosis.

5.  History of small bowel obstruction, status post exploratory laparotomy with 
removal of bezoar and abscess of the appendix at the same time in 2015.

6.  Osteoarthritis.

7.  Gastroesophageal reflux disease.

8.  Irritable bowel syndrome.

9.  Cataract surgery bilaterally.

10.  Hypertension.

 

MEDICATIONS AT DISCHARGE:  Include:

1.  Flagyl 500 mg 3 times a day for a total of 7 days.

2.  Cefdinir 300 mg p.o. b.i.d. for a total of 7 days.

 

The remaining medications are unchanged and include:

1.  Fosamax 70 mg weekly.

2.  Baclofen 10 mg t.i.d. p.r.n.

3.  Calcium carbonate 1 tablet daily.

4.  Coreg 3.125 mg b.i.d.

5.  Gabapentin 300 mg at bedtime.

6.  Hydrocodone with acetaminophen 5/325 mg on a p.r.n. basis.

7.  CellCept 500 mg b.i.d.

8.  Paxil 40 mg daily.

9.  Compazine on a p.r.n. basis.

10.  Coumadin 2.5 mg daily.

11.  BuSpar 5 mg b.i.d.

12.  Clonazepam 0.5 mg at bedtime.

13.  Prednisone 10 mg daily.

 

The patient was also prescribed Imodium 2 mg p.o. daily p.r.n. diarrhea.

 

LABORATORY DATA/STUDIES PERFORMED IN THE HOSPITAL STAY:  Include:  On 09/28/17, 
white blood cell count of 9.6, hemoglobin of 13.2, hematocrit of 40, and 
platelets of 285,000.  Sodium was 139, potassium 3.6, chloride 107, carbon 
dioxide 25, BUN 12, creatinine 0.72.

 

CT of the abdomen and pelvis obtained on 09/27/17, impression:  "Small 
pericardial effusion at the base of the heart.  Mild nonspecific enhancement of 
gallbladder wall.  Stable low density splenic lesion.  Numerous large left 
renal cysts and small right renal cyst with peripheral calcifications.  There 
was stability. Suspected ingested foreign body residing within the cecum.  This 
is likely an incidental finding.  Scattered diverticula in the sigmoid and 
descending colon. Uterine leiomyoma."

 

Microbiology tests were positive for Klebsiella pneumoniae UTI sensitive to 
most of the antibiotics tested apart from ampicillin.

 

HOSPITALIZATION COURSE:  Jillian Golden is a very nice 86-year-old female who 
appears to have residual tardive dyskinesia and chronic lip smacking behavior 
who presented to the [facility] complaining of 3-month duration of 
abdominal pain as well as diarrhea that occurred for 3 days prior to admission.
  The patient was initially placed on observation, but then the diarrhea had 
continued to be a problem and she was admitted to the hospital.  Her urinalysis 
was questionable and eventually, the patient's urine cultures grew Klebsiella 
pneumoniae over 100,000 colonies.  The patient was treated with ceftriaxone for 
that.  On the second day of her hospital stay, Flagyl was added on for 
possibility of diverticulitis.  That was due to continuation of abdominal 
discomfort as well as diarrhea.

 

The patient was noted to have foreign body in the cecum and that was reviewed 
with the general surgeon on-call.  It was noted that the patient has multiple 
diverticula, and possibility of diverticulitis was plausible.  On ceftriaxone 
and Flagyl, the patient improved dramatically, although she still had 
occasional loose stool during the daytime.  She used Imodium with good results 
prior to her discharge.

 

At discharge, the patient is recommended to follow up with her primary care 
provider in 4 to 7 days.

 

PHYSICAL EXAMINATION:  At the time of discharge, blood pressure of 130/61, 
heart rate of 80 and regular, respiratory rate 16, oxygen saturation 96% on 
room air, and temperature 98.6.  General:  The patient is a very pleasant 86-
year-old female who is not in acute distress.  Alert, awake, and oriented x3.  
HEENT:  Head: Atraumatic, normocephalic.  Eyes:  Pupils are equal, reactive to 
light and accommodation.  Oropharynx is clear.  Mucosa moist.  Neck:  Supple.  
No JVD.  No bruits bilaterally.  Cardiovascular: Regular rate and rhythm. No 
murmur.  Respiratory:  Clear to auscultation bilaterally.  Abdomen:  Soft, 
nontender.  Bowel sounds are present in all 4 quadrants.  Extremities: There is 
no edema. Pulses are +2 bilaterally.  No clubbing or cyanosis.  On neuro 
evaluation, the patient has frequent lip smacking behavior, likely due to 
tardive dyskinesia.  Otherwise, motor strength is 5/5 bilaterally.  Speech is 
clear.  Sensation is grossly intact.

 

Please note that this is a short summary of the patient's hospitalization.  
Please refer to further medical records for details.

 

TIME SPENT:  Approximately 35 minutes were spent on the patient's discharge.

 

 [id number]/[facility] #: [id number]

[facility]

## 2017-10-16 ENCOUNTER — HOSPITAL ENCOUNTER (EMERGENCY)
Dept: HOSPITAL 25 - ED | Age: 82
Discharge: HOME | End: 2017-10-16
Payer: MEDICARE

## 2017-10-16 VITALS — SYSTOLIC BLOOD PRESSURE: 147 MMHG | DIASTOLIC BLOOD PRESSURE: 71 MMHG

## 2017-10-16 DIAGNOSIS — G24.01: ICD-10-CM

## 2017-10-16 DIAGNOSIS — Z87.891: ICD-10-CM

## 2017-10-16 DIAGNOSIS — M79.651: ICD-10-CM

## 2017-10-16 DIAGNOSIS — Z88.0: ICD-10-CM

## 2017-10-16 DIAGNOSIS — M25.551: ICD-10-CM

## 2017-10-16 DIAGNOSIS — Z87.440: ICD-10-CM

## 2017-10-16 DIAGNOSIS — R19.7: ICD-10-CM

## 2017-10-16 DIAGNOSIS — M32.9: ICD-10-CM

## 2017-10-16 DIAGNOSIS — R65.10: ICD-10-CM

## 2017-10-16 DIAGNOSIS — K21.9: ICD-10-CM

## 2017-10-16 DIAGNOSIS — Z91.81: ICD-10-CM

## 2017-10-16 DIAGNOSIS — R53.1: ICD-10-CM

## 2017-10-16 DIAGNOSIS — M25.561: ICD-10-CM

## 2017-10-16 DIAGNOSIS — M25.552: Primary | ICD-10-CM

## 2017-10-16 DIAGNOSIS — Z86.718: ICD-10-CM

## 2017-10-16 LAB
ALBUMIN SERPL BCG-MCNC: 3.2 G/DL (ref 3.2–5.2)
ALP SERPL-CCNC: 42 U/L (ref 34–104)
ALT SERPL W P-5'-P-CCNC: 8 U/L (ref 7–52)
ANION GAP SERPL CALC-SCNC: 5 MMOL/L (ref 2–11)
AST SERPL-CCNC: (no result) U/L (ref 13–39)
BUN SERPL-MCNC: 13 MG/DL (ref 6–24)
BUN/CREAT SERPL: 17.1 (ref 8–20)
CALCIUM SERPL-MCNC: 9 MG/DL (ref 8.6–10.3)
CHLORIDE SERPL-SCNC: 105 MMOL/L (ref 101–111)
CK SERPL-CCNC: 54 U/L (ref 10–223)
GLOBULIN SER CALC-MCNC: 2.4 G/DL (ref 2–4)
GLUCOSE SERPL-MCNC: 86 MG/DL (ref 70–100)
HCO3 SERPL-SCNC: 26 MMOL/L (ref 22–32)
HCT VFR BLD AUTO: 40 % (ref 35–47)
HGB BLD-MCNC: 13.2 G/DL (ref 12–16)
MCH RBC QN AUTO: 31 PG (ref 27–31)
MCHC RBC AUTO-ENTMCNC: 33 G/DL (ref 31–36)
MCV RBC AUTO: 93 FL (ref 80–97)
POTASSIUM SERPL-SCNC: (no result) MMOL/L (ref 3.5–5)
PROT SERPL-MCNC: 5.6 G/DL (ref 6.4–8.9)
RBC # BLD AUTO: 4.29 10^6/UL (ref 4–5.4)
SODIUM SERPL-SCNC: 136 MMOL/L (ref 133–145)
TROPONIN I SERPL-MCNC: 0 NG/ML (ref ?–0.04)
WBC # BLD AUTO: 10.7 10^3/UL (ref 3.5–10.8)

## 2017-10-16 PROCEDURE — 81015 MICROSCOPIC EXAM OF URINE: CPT

## 2017-10-16 PROCEDURE — 85610 PROTHROMBIN TIME: CPT

## 2017-10-16 PROCEDURE — 85025 COMPLETE CBC W/AUTO DIFF WBC: CPT

## 2017-10-16 PROCEDURE — 80349 CANNABINOIDS NATURAL: CPT

## 2017-10-16 PROCEDURE — 87086 URINE CULTURE/COLONY COUNT: CPT

## 2017-10-16 PROCEDURE — 71010: CPT

## 2017-10-16 PROCEDURE — 86140 C-REACTIVE PROTEIN: CPT

## 2017-10-16 PROCEDURE — 85730 THROMBOPLASTIN TIME PARTIAL: CPT

## 2017-10-16 PROCEDURE — 87077 CULTURE AEROBIC IDENTIFY: CPT

## 2017-10-16 PROCEDURE — 93005 ELECTROCARDIOGRAM TRACING: CPT

## 2017-10-16 PROCEDURE — 83880 ASSAY OF NATRIURETIC PEPTIDE: CPT

## 2017-10-16 PROCEDURE — 70450 CT HEAD/BRAIN W/O DYE: CPT

## 2017-10-16 PROCEDURE — 72125 CT NECK SPINE W/O DYE: CPT

## 2017-10-16 PROCEDURE — 82550 ASSAY OF CK (CPK): CPT

## 2017-10-16 PROCEDURE — 80053 COMPREHEN METABOLIC PANEL: CPT

## 2017-10-16 PROCEDURE — 96361 HYDRATE IV INFUSION ADD-ON: CPT

## 2017-10-16 PROCEDURE — 81003 URINALYSIS AUTO W/O SCOPE: CPT

## 2017-10-16 PROCEDURE — 36415 COLL VENOUS BLD VENIPUNCTURE: CPT

## 2017-10-16 PROCEDURE — 96360 HYDRATION IV INFUSION INIT: CPT

## 2017-10-16 PROCEDURE — 73523 X-RAY EXAM HIPS BI 5/> VIEWS: CPT

## 2017-10-16 PROCEDURE — G0480 DRUG TEST DEF 1-7 CLASSES: HCPCS

## 2017-10-16 PROCEDURE — 87186 SC STD MICRODIL/AGAR DIL: CPT

## 2017-10-16 PROCEDURE — 87040 BLOOD CULTURE FOR BACTERIA: CPT

## 2017-10-16 PROCEDURE — 83605 ASSAY OF LACTIC ACID: CPT

## 2017-10-16 PROCEDURE — 85652 RBC SED RATE AUTOMATED: CPT

## 2017-10-16 PROCEDURE — 99283 EMERGENCY DEPT VISIT LOW MDM: CPT

## 2017-10-16 PROCEDURE — 84484 ASSAY OF TROPONIN QUANT: CPT

## 2017-10-16 RX ADMIN — SODIUM CHLORIDE ONE ML: 900 IRRIGANT IRRIGATION at 19:36

## 2017-10-16 RX ADMIN — SODIUM CHLORIDE ONE ML: 900 IRRIGANT IRRIGATION at 17:58

## 2017-10-16 NOTE — ED
Mena HEREDIA Nilda, scribed for Jordy Portillo MD on 10/16/17 at 1945 .





Progress





- Progress Note


Progress Note: 


This patient was signed out by Dr. Espinoza, pending Ortho consult, CXR, Hip Xray

, and Knee Xray. 





CXR reveals, per radiologist, no active pulmonary disease noted. 


Hip XRAY reveals, per radiologist, no fracture of either hip is noted. 


Knee XRAY reveals, per radiologist, no fracture of the right knee is noted. 


ED Physician has reviewed these reports and agrees. 





[1943] Dr. Espinoza consulted with Dr. Daniel (Ortho) who will come to ED to 

see patient. 





[2143] Dr. Portillo consulted with Dr. Daniel (Ortho) and discussed patient.





[2156] Re-eval: discussed plan to discharge patient. Patient agreeable with 

this plan.





Course/Dx





- Course


Course Of Treatment: SEEN BY ORTHO; NO SIGN OF SEPTIC JOINT AT THIS TIME.  KNEE 

ERYTHEMA/SWELLING IMPROVED AT DISCHARGE.  INR 8.  THIS WAS DISCUSSED WITH THE 

PATIENT AND HER DAUGHTER.  PATIENT WILL NOT TAKE HER COUMADIN TONIGHT.  SHE 

WILL DISCUSS THIS WITH HER DOCTOR TOMORROW, 10/17/17.  SHE WILL RETURN WITH ANY 

SIGNS OF BLEEDING.  NOTHING GIVEN IN ED TO REVERSE THE INR AS THERE IS NO SIGN 

OF BLEEDING NOW.





- Diagnoses


Provider Diagnoses: 


 Knee pain, right, Elevated INR








The documentation as recorded by the Mena yi Nilda accurately 

reflects the service I personally performed and the decisions made by me, 

Jordy Portillo MD.

## 2017-10-16 NOTE — ED
Progress





- Progress Note


Progress Note: 





Pt greeted in hallway with EMS personnel, and in room with daughter and BLANQUITA Leahy. Pt lives alone, pushed her lifelink button to [facility] after a reported 

mechanical fall. Pt has lupus and is s/p splenic infarct, is on Cellcept, so 

immunosuppressed, and has temp 100.3 temporal and  on adm, so meets SIRS 

criteria so sepsis order set initiated and fluids ordered, but not 

antibiotics..  Also pt is on warfarin for hx recurrent DVT's, and pt reports 

she did hit her head yesterday, so CT brain and CT C-spine ordered.  Pt was 

just DC'd on 9/30/17 after adm for diverticulitis and Klebsiella UTI.  Daughter 

reports that she has finished the cefdinir and flagyl that she took for that.  


Exam briefly in room shows pt alert, appropriate, resps unlabored, and lungs 

clear, S1S2 sinus tach on monitor 106, abd soft, nontender.  Pt complains of 

bilat hip pain on palpation and with movement, can extend both legs above the 

stretcher and flex both knees and there is no foreshortening.  Right knee feels 

warm to touch and is painful, not red, possible lupus flare. 





Sepsis order set  and fluids initiated without abx ordered.  


CT brain and CT -spine for pt with fall on warfarin.


xray bilat hips (with pelvis) for pain after mechanical fall.


hydrocodone 5/325mg given for pain.  Daughter states gabapentin doesn't help 

much for this much pain.





Course/Dx





- Diagnoses


Provider Diagnoses: 


 Fall, SIRS (systemic inflammatory response syndrome)

## 2017-10-16 NOTE — RAD
Indication: Knee redness and swelling. Lupus.



Single frontal view of the chest and shape no mediastinal shift. Heart is normal size and

configuration. Lung fields appear clear. No changes noted since previous exam of September 27, 2017.



IMPRESSION: No active cardiopulmonary disease is noted.

## 2017-10-16 NOTE — RAD
Indication: Bilateral hip pain.



2 views of the right hip and 2 views of left hip are reviewed. AP view the pelvis was also

reviewed. There is no fracture or dislocation. No other bone or joint abnormality is

identified.



IMPRESSION: No fracture of either hip is noted.

## 2017-10-16 NOTE — RAD
Indication: Fall, patient on anticoagulants with head injury.



CT of the brain was performed without IV contrast.



Comparison is made with previous exam dated October 29, 2014.



Ventricular structures are midline. No midline shift is noted. Central and cortical

atrophy is noted. Periventricular lucency consistent with chronic ischemic White matter

change is noted. There is no evidence of intracranial mass or hemorrhage.



IMPRESSION: Chronic ischemic White matter change. No intracranial mass or hemorrhage is

noted. No changes noted since October 29, 2014.

## 2017-10-16 NOTE — RAD
Indication: Right knee pain.



4 views of the right knee demonstrates no evidence of joint effusion. No fracture is

identified.



IMPRESSION: No fracture of the right knee is noted.

## 2017-10-16 NOTE — RAD
Indication: Fall, neck injury.



CT of the cervical spine was obtained in the axial plane. Sagittal and coronal

reconstructed images were obtained.



Skull base demonstrates no fracture. Mastoid air cells are well aerated. The C1 ring is

intact. The C2 vertebra demonstrates no fracture.



Plate process is unremarkable.



The vertebral bodies otherwise appear normal in height and alignment with no compression

fracture. Incidental lucent area is noted in the vertebral body superior endplate of C4.

Degenerative disc disease at C5-C6, C6-C7 and C7-T1 is noted.



IMPRESSION: No fracture of the cervical spine is noted.



Lucent area superior aspect of C4.

## 2017-10-17 NOTE — CONS
ORTHOPEDIC CONSULTATION REPORT:

 

DATE OF CONSULT:  10/16/17

 

REQUESTING SERVICE:  ER.

 

CHIEF COMPLAINT:  Right knee pain.

 

HISTORY OF PRESENT ILLNESS:  Jillian is an 86-year-old woman with lupus who was 
brought into the emergency room for generalized weakness.  Upon questioning, 
she did report right knee pain and swelling over the past few weeks, so 
Orthopedics was consulted for this.  Upon my questioning, she reports that she 
has been having some right knee pain and swelling over the last few weeks that 
was particularly bothersome last week, but has actually been improving over the 
last few days.

 

PAST MEDICAL HISTORY:

1.  Systemic lupus erythematosus.

2.  Osteoporosis.

3.  Spleen infarct.

4.  Osteoarthritis of the neck.

5.  UTI's.

6.  Hypertension.

7.  Chronic pain.

8.  Diarrhea.

 

PHYSICAL EXAMINATION:  She is well appearing and in no apparent distress. 
Nonlabored breathing.  Examination of her bilateral hips reveals painless 
passive and active range of motion with both flexion and extension and internal 
and external rotation.  Examination of her right lower extremity reveals the 
skin is intact throughout.  There is minimal-to-no knee joint effusion when 
compared to the other side.  No warmth.  No erythema.  Active range of motion 0 
to 90 and painless. No catching or locking.  Distally, she has a palpable DP 
pulse.  Sensation is intact.

 

IMAGING:  X-rays of her bilateral hips as well as her right knee were performed 
and did not show any fractures.

 

LABORATORY DATA:  White blood count 10, ESR 47, CRP 10.

 

ASSESSMENT AND PLAN:  Jillian is an 86-year-old woman who came into the ER for 
generalized weakness.  Ortho was consulted for right knee pain.  At this time 
she has minimal pain, good range of motion and no evidence of infection such as 
swelling, erythema, warmth or pain.  Additionally, she has good range of motion 
in the hips.  Given this, I do not think she has a septic joint in the knee or 
the hips given the reassuring exam.  Possible this is a lupus flare.  It could 
also be something else, but the fact that it has been improving is promising.  
She is admitted, we will continue to follow her.  When she is discharged, she 
can follow up as an outpatient should the knee pain recur.  All of her and her 
daughter's questions were answered.

 

 [id number]/[facility] #: [id number]

[facility]

## 2017-10-17 NOTE — ED
Larry HEREDIA Alfonso scribed for Will Espinoza MD on 10/16/17 at 1804 .





Adult Trauma





- HPI Summary


HPI Summary: 





This patient [facility] 86 [facility] s/p a 

mechanical fall earlier today. She also fell yesterday. Today she used her life 

alert button after my legs just gave out. The patient rates the pain 4/10 in 

severity. Symptoms aggravated by nothing. Symptoms alleviated by nothing. 

Patient reports diffuse myalgia, bilateral hip pain, right knee pain, right 

thigh pain, and diarrhea. Patient denies urinary symptoms, headache, and 

dizziness.





- History of Current Complaint


Chief Complaint: EDGeneral


Stated Complaint: FALL


Time Seen by Provider: 10/16/17 17:31


Hx Obtained From: Patient


Mechanism of Injury: Fall


Ambulatory at the Scene: No - Life alert


Onset of Pain: Prior to Arrival


Current Severity: Moderate


Pain Intensity: 4


Pain Scale Used: 0-10 Numeric


Aggravating Factor(s): Nothing


Alleviating Factor(s): Nothing


Associated Signs & Symptoms: Positive: Other: - diffuse myalgia, bilateral hip 

pain, right knee pain, right thigh pain, and diarrhea. Patient denies urinary 

symptoms, headache, and dizziness.





- Additional Pertinent History


Primary Care Physician: [id number]





- Allergy/Home Medications


Allergies/Adverse Reactions: 


 Allergies











Allergy/AdvReac Type Severity Reaction Status Date / Time


 


Penicillins [PCN] Allergy Intermediate Rash Verified 16 11:22














PMH/Surg Hx/FS Hx/Imm Hx


Endocrine/Hematology History: Reports: Hx Systemic Lupus Erythematosus, Other 

Endocrine/Hematological Disorders - LUPUS


   Denies: Hx Anticoagulant Therapy, Hx Blood Disorders, Hx Blood Transfusions, 

Hx Bone Marrow Disease, Hx Diabetes, Hx Sickle Cell Disease, Hx Thyroid Disease

, Hx Anemia, Hx Unexplained Bleeding


Cardiovascular History: Reports: Hx Deep Vein Thrombosis, Hx Embolism - Splenic 

artery thrombosis, Hx Hypertension


   Denies: Hx Congestive Heart Failure, Hx Pacemaker/ICD, Other Cardiovascular 

Problems/Disorders


Respiratory History: 


   Denies: Hx Asthma, Hx Chronic Obstructive Pulmonary Disease (COPD)


GI History: Reports: Hx Gastroesophageal Reflux Disease, Hx Irritable Bowel, Hx 

Obstructive Bowel - SBO, Other GI Disorders - Diverticulitis


   Denies: Hx Cirrhosis, Hx Crohn's Disease, Hx Diverticulosis, Hx Gall Bladder 

Disease, Hx Gastrointestinal Bleed, Hx Hiatal Hernia, Hx Jaundice, Hx Ileostomy

, Hx Pyloric Stenosis, Hx Ulcer


 History: Reports: Other  Problems/Disorders - UTIs several x's a yr


   Denies: Hx Dialysis, Hx Renal Disease


Musculoskeletal History: Reports: Hx Arthritis - DJD of neck, RA, Hx Back 

Problems, Hx Fibromyalgia, Other Musculoskeletal History - RHEUMATOID ARTHRITIS


   Denies: Hx Rheumatoid Arthritis, Hx Bursitis, Hx Congenital Bone 

Abnormalities, Hx Gout, Hx Orthopedic Injury, Hx Osteoporosis, Hx Scoliosis, Hx 

Tendonitis


Sensory History: Reports: Hx Cataracts, Hx Contacts or Glasses, Hx Vision 

Problem


   Denies: Hx Eye Injury, Hx Eye Prosthesis, Hx Glaucoma, Hx Macular 

Degeneration, Hx Deafness, Hx Hearing Aid, Hx Hearing Problem, Other Sensory 

Impairments


Opthamlomology History: Reports: Hx Cataracts, Hx Contacts or Glasses, Hx 

Vision Problem


   Denies: Hx Eye Injury, Hx Eye Prosthesis, Hx Glaucoma, Hx Macular 

Degeneration, Other Sensory Impairments


Neurological History: Reports: Other Neuro Impairments/Disorders - Tardive 

dyskinesia


   Denies: Hx Dementia, Hx Seizures


Psychiatric History: Reports: Hx Anxiety - LUPUS RELATED, Hx Eating Disorder, 

Hx Depression - LUPUS RELATED


   Denies: Hx Attention Deficit Hyperactivity Disorder, Hx Panic Disorder, Hx 

Post Traumatic Stress Disorder, Hx Inpatient Treatment, Hx Schizophrenia, Hx 

Bipolar Disorder, Hx Suicide Attempt, Hx Substance Abuse, Other Psychiatric 

Issues/Disorders





- Cancer History


Hx Chemotherapy: No





- Surgical History


Surgery Procedure, Year, and Place: .  APPENDECTOMY 40 YEARS AGO (?)


Hx Anesthesia Reactions: No


Infectious Disease History: No


Infectious Disease History: 


   Denies: Hx Clostridium Difficile, Hx Hepatitis, Hx Human Immunodeficiency 

Virus (HIV), Hx of Known/Suspected MRSA, Hx Shingles, Hx Tuberculosis, Traveled 

Outside the [place] in Last 30 Days





- Family History


Known Family History: 


   Negative: Hypertension





- Social History


Lives: Alone


Alcohol Use: None


Hx Substance Use: No


Substance Use Type: Reports: None


Hx Tobacco Use: Yes


Smoking Status (MU): Former Smoker


Type: Cigarettes


Amount Used/How Often: 40 years


Have You Smoked in the Last Year: No





Review of Systems


Negative: Fever, Chills


Negative: Erythema


Negative: Sore Throat


Negative: Chest Pain


Negative: Shortness Of Breath, Cough


Positive: Diarrhea.  Negative: Abdominal Pain, Vomiting, Nausea


Positive: no symptoms reported.  Negative: dysuria, hematuria


Positive: Myalgia, Other - fall, bilateral hip pain, right knee pain, right 

thigh pain.  Negative: Edema


Negative: Rash


Neurological: Other - Negative dizziness


Negative: Headache


All Other Systems Reviewed And Are Negative: Yes





Physical Exam





- Summary


Physical Exam Summary: 





Constitutional: Well-developed, Well-nourished, Alert, Cooperative


Skin: Warm, Dry, Right knee hot to the touch. 


HENT: Normocephalic; No Racoons eyes; No battles sign; No abrasion; No contusion

; No hemotympanum; No maxilla facial tenderness or instability; Dentition are 

smooth; No dental trauma; No trismus


Eyes: EOM normal, PERRL


Neck: Trachea is midline. No stridor; No JVD; No step off; No posterior 

cervical spine tenderness


Cardio: Rhythm regular, rate normal Heart sounds normal; Intact distal pulses; 

The pedal pulses are 2+ and symmetric. Radial pulses are 2+ and symmetric.


Pulmonary/Chest wall: Effort normal; Breath sounds normal; Equal chest rise; No 

flail segment; No rib tenderness; No sternal tenderness


Abd: Soft, Appearance normal. No distension; No tenderness; No palpable 

pulsatile mass; No Cullens sign; No Grey-Turners sign


Musculoskeletal: Full ROM and no tenderness at hips, ankles, shoulders, elbows 

and knees; No joint swelling; No vertebral body tenderness; No paraspinal 

tenderness; No step off or deformity of the spine; Pelvis is stable to lateral 

compression and rock. Right ribs 5-8 tender to palpation.


Neuro: Alert, Oriented x3, Strength 5/5 all extremities.


: No blood at urethral meatus


Psych: Mood and affect Normal


Triage Information Reviewed: Yes


Vital Signs On Initial Exam: 


 Initial Vitals











Temp Pulse Resp BP Pulse Ox


 


 100.3 F   113   16   131/65   95 


 


 10/16/17 17:22  10/16/17 17:22  10/16/17 17:22  10/16/17 17:22  10/16/17 17:22











Vital Signs Reviewed: Yes





- Danielle Coma Scale


Coma Scale Total: 15





Diagnostics





- Vital Signs


 Vital Signs











  Temp Pulse Resp BP Pulse Ox


 


 10/16/17 17:22  100.3 F  113  16  131/65  95














- Laboratory


Result Diagrams: 


 10/16/17 18:08





 10/16/17 18:08


Lab Statement: Any lab studies that have been ordered have been reviewed, and 

results considered in the medical decision making process.





- Radiology


  ** CXR


Radiology Interpretation Completed By: Radiologist - Pending official 

interpretation from radiologist. See meditech.





  ** Hip X-Ray


Radiology Interpretation Completed By: Radiologist - Pending official 

interpretation from radiologist. See meditech.





  ** Right Knee X-Ray


Radiology Interpretation Completed By: Radiologist - Pending official 

interpretation from radiologist. See meditech.





- CT


  ** Brain


CT Interpretation Completed By: Radiologist - Chronic ischemic White matter 

change. No intracranial mass or hemorrhage is noted. No changes noted since 

2014. ED physician has reviewed this radiology report and agrees.





  ** C-Spine


CT Interpretation Completed By: Radiologist - No fracture of the cervical spine 

is noted. Lucent area superior aspect of C4. ED physician has reviewed this 

radiology report and agrees.





- EKG


  ** 1817


Cardiac Rate: NL - BPM 99


EKG Rhythm: Sinus Rhythm


EKG Interpretation: No STEMI





Adult Trauma Course/Dx





- Course


Assessment/Plan: This patient is an 86 [facility] F [facility] 

daughter s/p a mechanical fall earlier today. She also fell yesterday. Today 

she used her life alert button after my legs just gave out. The patient rates 

the pain 4/10 in severity. Symptoms aggravated by nothing. Symptoms alleviated 

by nothing. Patient reports diffuse myalgia, bilateral hip pain, right knee pain

, right thigh pain, and diarrhea. Patient denies urinary symptoms, headache, 

and dizziness. An EKG reveals NSR. CT Brain reveals Chronic ischemic White 

matter change. No intracranial mass or hemorrhage is noted. No changes noted 

since 2014. ED physician has reviewed this radiology report and 

agrees. CT C-Spine reveals No fracture of the cervical spine is noted. Lucent 

area superior aspect of C4. ED physician has reviewed this radiology report and 

agrees. CXR, Hip X-ray, and Right Hip X-Ray all pending at this time. Sign out 

to Dr. Portillo, pending dispo, awaiting XR, labs, and orthopedic consult.





- Diagnoses


Provider Diagnoses: 


 Fall, SIRS (systemic inflammatory response syndrome)








- Physician Notifications


Discussed Care Of Patient With: Johnathan Keys


Time Discussed With Above Provider: 18:44


Instructed by Provider To: Other - Consulted Dr. Daniel (orthopedics) 

regarding concern for possible septic arthritis in the right knee and he will 

see the patient at [facility]





Discharge





- Discharge Plan


Condition: Stable


Disposition: OTHER


Discharge Disposition Comment: Sign out to Dr. Portillo, pending dispo, 

awaiting XR, labs, and consult





The documentation as recorded by the omairaibLarry jones Alfonso accurately reflects 

the service I personally performed and the decisions made by me, Will Espinoza MD.

## 2017-10-18 NOTE — PN
Progress Note





- Progress Note


Date of Service: 10/18/17


Note: 


Patient urine culture grew Enterbacter cloacae 10-25,000. will wait for final 

culture.

## 2017-11-17 ENCOUNTER — HOSPITAL ENCOUNTER (EMERGENCY)
Dept: HOSPITAL 25 - ED | Age: 82
Discharge: HOME | End: 2017-11-17
Payer: MEDICARE

## 2017-11-17 VITALS — SYSTOLIC BLOOD PRESSURE: 140 MMHG | DIASTOLIC BLOOD PRESSURE: 72 MMHG

## 2017-11-17 DIAGNOSIS — K80.20: Primary | ICD-10-CM

## 2017-11-17 DIAGNOSIS — K76.0: ICD-10-CM

## 2017-11-17 LAB
ALBUMIN SERPL BCG-MCNC: 3.5 G/DL (ref 3.2–5.2)
ALP SERPL-CCNC: 44 U/L (ref 34–104)
ALT SERPL W P-5'-P-CCNC: 10 U/L (ref 7–52)
ANION GAP SERPL CALC-SCNC: 8 MMOL/L (ref 2–11)
APAP SERPL-MCNC: 21 MCG/ML
AST SERPL-CCNC: 15 U/L (ref 13–39)
BUN SERPL-MCNC: 14 MG/DL (ref 6–24)
BUN/CREAT SERPL: 24.1 (ref 8–20)
CALCIUM SERPL-MCNC: 8.6 MG/DL (ref 8.6–10.3)
CHLORIDE SERPL-SCNC: 104 MMOL/L (ref 101–111)
GLOBULIN SER CALC-MCNC: 1.7 G/DL (ref 2–4)
GLUCOSE SERPL-MCNC: 78 MG/DL (ref 70–100)
HCO3 SERPL-SCNC: 25 MMOL/L (ref 22–32)
HCT VFR BLD AUTO: 44 % (ref 35–47)
HGB BLD-MCNC: 14.2 G/DL (ref 12–16)
MCH RBC QN AUTO: 30 PG (ref 27–31)
MCHC RBC AUTO-ENTMCNC: 32 G/DL (ref 31–36)
MCV RBC AUTO: 93 FL (ref 80–97)
POTASSIUM SERPL-SCNC: 3.8 MMOL/L (ref 3.5–5)
PROT SERPL-MCNC: 5.2 G/DL (ref 6.4–8.9)
RBC # BLD AUTO: 4.71 10^6/UL (ref 4–5.4)
SODIUM SERPL-SCNC: 137 MMOL/L (ref 133–145)
WBC # BLD AUTO: 10.6 10^3/UL (ref 3.5–10.8)

## 2017-11-17 PROCEDURE — 80329 ANALGESICS NON-OPIOID 1 OR 2: CPT

## 2017-11-17 PROCEDURE — G0480 DRUG TEST DEF 1-7 CLASSES: HCPCS

## 2017-11-17 PROCEDURE — 36415 COLL VENOUS BLD VENIPUNCTURE: CPT

## 2017-11-17 PROCEDURE — 80053 COMPREHEN METABOLIC PANEL: CPT

## 2017-11-17 PROCEDURE — 85027 COMPLETE CBC AUTOMATED: CPT

## 2017-11-17 NOTE — ED
Angel HEREDIA Angela scribed for Will Espinoza MD on 17 at 1252 .





Complex/Multi-Sys Presentation





- HPI Summary


HPI Summary: 





This pt is an 85 y/o female presenting to [facility] via EMS from home c/o pain "all 

over" since this morning. Pt reports that she usually takes 2 pills of 

hydrocodone for the pain. She notes she ran out of her hydrocodone pills 

yesterday. Pt has not taken any pain medication today due to running out of 

hydrocodone. Pt denies any falls or injuries. She states that her daughter, Holly

, can be called to notified she is in the hospital. 


PMHx includes lupus. 





- History Of Current Complaint


Chief Complaint: EDGeneral


Time Seen by Provider: 17 11:27


Hx Obtained From: Patient


Onset/Duration: Lasting Hours, Still Present


Timing: Hours


Location: Pain At: - "all over"





- Allergies/Home Medications


Allergies/Adverse Reactions: 


 Allergies











Allergy/AdvReac Type Severity Reaction Status Date / Time


 


Penicillins [PCN] Allergy Intermediate Rash Verified 16 11:22














PMH/Surg Hx/FS Hx/Imm Hx


Endocrine/Hematology History: Reports: Hx Systemic Lupus Erythematosus, Other 

Endocrine/Hematological Disorders - LUPUS


   Denies: Hx Anticoagulant Therapy, Hx Blood Disorders, Hx Blood Transfusions, 

Hx Bone Marrow Disease, Hx Diabetes, Hx Sickle Cell Disease, Hx Thyroid Disease

, Hx Anemia, Hx Unexplained Bleeding


Cardiovascular History: Reports: Hx Deep Vein Thrombosis, Hx Embolism - Splenic 

artery thrombosis, Hx Hypertension


   Denies: Hx Congestive Heart Failure, Hx Pacemaker/ICD, Other Cardiovascular 

Problems/Disorders


Respiratory History: 


   Denies: Hx Asthma, Hx Chronic Obstructive Pulmonary Disease (COPD)


GI History: Reports: Hx Gastroesophageal Reflux Disease, Hx Irritable Bowel, Hx 

Obstructive Bowel - SBO, Other GI Disorders - Diverticulitis


   Denies: Hx Cirrhosis, Hx Crohn's Disease, Hx Diverticulosis, Hx Gall Bladder 

Disease, Hx Gastrointestinal Bleed, Hx Hiatal Hernia, Hx Jaundice, Hx Ileostomy

, Hx Pyloric Stenosis, Hx Ulcer


 History: Reports: Other  Problems/Disorders - UTIs several x's a yr


   Denies: Hx Dialysis, Hx Renal Disease


Musculoskeletal History: Reports: Hx Arthritis - DJD of neck, RA, Hx Back 

Problems, Hx Fibromyalgia, Other Musculoskeletal History - RHEUMATOID ARTHRITIS


   Denies: Hx Rheumatoid Arthritis, Hx Bursitis, Hx Congenital Bone 

Abnormalities, Hx Gout, Hx Orthopedic Injury, Hx Osteoporosis, Hx Scoliosis, Hx 

Tendonitis


Sensory History: Reports: Hx Cataracts, Hx Contacts or Glasses, Hx Vision 

Problem


   Denies: Hx Eye Injury, Hx Eye Prosthesis, Hx Glaucoma, Hx Macular 

Degeneration, Hx Deafness, Hx Hearing Aid, Hx Hearing Problem, Other Sensory 

Impairments


Opthamlomology History: Reports: Hx Cataracts, Hx Contacts or Glasses, Hx 

Vision Problem


   Denies: Hx Eye Injury, Hx Eye Prosthesis, Hx Glaucoma, Hx Macular 

Degeneration, Other Sensory Impairments


Neurological History: Reports: Other Neuro Impairments/Disorders - Tardive 

dyskinesia


   Denies: Hx Dementia, Hx Seizures


Psychiatric History: Reports: Hx Anxiety - LUPUS RELATED, Hx Eating Disorder, 

Hx Depression - LUPUS RELATED


   Denies: Hx Attention Deficit Hyperactivity Disorder, Hx Panic Disorder, Hx 

Post Traumatic Stress Disorder, Hx Inpatient Treatment, Hx Schizophrenia, Hx 

Bipolar Disorder, Hx Suicide Attempt, Hx Substance Abuse, Other Psychiatric 

Issues/Disorders





- Cancer History


Hx Chemotherapy: No





- Surgical History


Surgery Procedure, Year, and Place: .  APPENDECTOMY 40 YEARS AGO (?)


Hx Anesthesia Reactions: No


Infectious Disease History: Yes


Infectious Disease History: 


   Denies: Hx Clostridium Difficile, Hx Hepatitis, Hx Human Immunodeficiency 

Virus (HIV), Hx of Known/Suspected MRSA, Hx Shingles, Hx Tuberculosis, Traveled 

Outside the [place] in Last 30 Days





- Family History


Known Family History: 


   Negative: Hypertension





- Social History


Alcohol Use: None


Hx Substance Use: No


Substance Use Type: Reports: None


Hx Tobacco Use: Yes


Smoking Status (MU): Former Smoker


Type: Cigarettes


Amount Used/How Often: 40 years


Have You Smoked in the Last Year: No





Review of Systems


Negative: Fever, Chills, Other - fall or injuries


Negative: Erythema


Negative: Sore Throat


Negative: Chest Pain


Negative: Shortness Of Breath, Cough


Negative: Abdominal Pain, Vomiting, Nausea


Negative: dysuria, hematuria


Musculoskeletal: Other - pain "all over"


Negative: Myalgia, Edema - legs


Negative: Rash


Neurological: Negative - dizziness


All Other Systems Reviewed And Are Negative: Yes





Physical Exam





- Summary


Physical Exam Summary: 





Constitutional: Well-developed, Well-nourished, Alert. (-) Distressed


Skin: Warm, Dry


HENT: Normocephalic; Atraumatic 


Eyes: Conjunctiva normal


Neck: Musculoskeletal ROM normal neck. (-) JVD, (-) Stridor, (-) Tracheal 

deviation


Cardio: Rhythm regular, rate normal, Heart sounds normal; Intact distal pulses; 

The pedal pulses are 2+ and symmetric. Radial pulses are 2+ and symmetric. (-) 

Murmur


Pulmonary/Chest wall: Effort normal. (-) Respiratory distress, (-) Wheezes, (-) 

Rales


Abd: Soft, (-) Tenderness, (-) Distension, (-) Guarding, (-) Rebound


Musculoskeletal: (-) Edema


Lymph: (-) Cervical adenopathy


Neuro: Alert, Oriented x3. Pt is able to pull herself up. 


Psych: Mood and affect Normal


Triage Information Reviewed: Yes


Vital Signs On Initial Exam: 


 Initial Vitals











Temp Pulse Resp BP Pulse Ox


 


 98 F   109   18   144/79   97 


 


 17 10:59  17 10:59  17 10:59  17 10:59  17 10:59











Vital Signs Reviewed: Yes





- [place] Coma Scale


Coma Scale Total: 15





Diagnostics





- Vital Signs


 Vital Signs











  Temp Pulse Resp BP Pulse Ox


 


 17 10:59  98 F  109  18  144/79  97














- Laboratory


Result Diagrams: 


 17 15:02





 17 15:02


Lab Statement: Any lab studies that have been ordered have been reviewed, and 

results considered in the medical decision making process.





Complex Multi-Symp Course/Dx


Assessment/Plan: This pt is an 85 y/o female presenting to [facility] via EMS from 

home c/o pain "all over" since this morning. Pt reports that she usually takes 

2 pills of hydrocodone for the pain. She notes she ran out of her hydrocodone 

pills yesterday. Pt has not taken any pain medication today due to running out 

of hydrocodone. Pt denies any falls or injuries. She states that her daughter, 

Holly, can be called to notified she is in the hospital.  PMHx includes lupus.  

Pt has signs and symptoms of opioid withdrawal. In the ED course, the pt was 

given Norco for the pain. The pt's pharmacy was called and the pt last filled 

her prescription for hydrocodone on , 120 pills (a 60 day supply), 

which was used in 17 days. I also called the pt's daughter, Holly, and left a 

voicemail. The daughter has not returned the call. I also called pt's PCP, Dr. Sapp, and told the nurse that the pt ran out of her medication, hydrocodone, 

12 days early, which can be a potential overdose. I will prescribe a 3 day 

course of hydrocodone but they will need to make a decision about continuining 

the pt's hydrocodone and possibly investigating what happened to her 

medications.





- Diagnoses


Provider Diagnoses: 


 Opioid withdrawal, Non compliance w medication regimen








- Physician Notifications


Discussed Care Of Patient With: Chato Sapp


Time Discussed With Above Provider: 14:50


Instructed by Provider To: Other - I called pt's PCP, Dr. Sapp, and told the 

nurse that the pt ran out of her medication, hydrocodone, 12 days early, which 

can be a potential overdose. I will prescribe a 3 day course of hydrocodone but 

they need to make a decision about continuining the pt's hydrocodone and 

possibly investigating what happened to her medications.





Discharge





- Discharge Plan


Condition: Stable


Disposition: HOME


Prescriptions: 


HYDROcodone/ACETAMIN 5-325 MG* [Norco 5-325 TAB*] 1 tab PO QID PRN #12 tab MDD 4


 PRN Reason: Pain


Patient Education Materials:  Opioid Withdrawal (ED)


Referrals: 


Chato Sapp MD [Primary Care Provider] - 


Additional Instructions: 





Please follow up with your primary care provider, Dr. Sapp.





RETURN TO THE EMERGENCY DEPARTMENT FOR CHANGING OR WORSENING SYMPTOMS.





The documentation as recorded by the Angel yi Angela accurately reflects 

the service I personally performed and the decisions made by me, Will Espinoza MD.

## 2018-05-26 ENCOUNTER — HOSPITAL ENCOUNTER (EMERGENCY)
Dept: HOSPITAL 25 - ED | Age: 83
Discharge: HOME | End: 2018-05-26
Payer: MEDICARE

## 2018-05-26 VITALS — DIASTOLIC BLOOD PRESSURE: 75 MMHG | SYSTOLIC BLOOD PRESSURE: 140 MMHG

## 2018-05-26 DIAGNOSIS — Z86.718: ICD-10-CM

## 2018-05-26 DIAGNOSIS — N39.0: Primary | ICD-10-CM

## 2018-05-26 DIAGNOSIS — M79.604: ICD-10-CM

## 2018-05-26 DIAGNOSIS — B96.4: ICD-10-CM

## 2018-05-26 DIAGNOSIS — F03.90: ICD-10-CM

## 2018-05-26 DIAGNOSIS — Z88.0: ICD-10-CM

## 2018-05-26 DIAGNOSIS — J98.4: ICD-10-CM

## 2018-05-26 DIAGNOSIS — M79.671: ICD-10-CM

## 2018-05-26 DIAGNOSIS — M79.605: ICD-10-CM

## 2018-05-26 DIAGNOSIS — Z87.891: ICD-10-CM

## 2018-05-26 DIAGNOSIS — M79.672: ICD-10-CM

## 2018-05-26 LAB
BASOPHILS # BLD AUTO: 0.1 10^3/UL (ref 0–0.2)
EOSINOPHIL # BLD AUTO: 0.1 10^3/UL (ref 0–0.6)
HCT VFR BLD AUTO: 42 % (ref 35–47)
HGB BLD-MCNC: 13.2 G/DL (ref 12–16)
INR PPP/BLD: 3.53 (ref 0.77–1.02)
LYMPHOCYTES # BLD AUTO: 2.1 10^3/UL (ref 1–4.8)
MCH RBC QN AUTO: 30 PG (ref 27–31)
MCHC RBC AUTO-ENTMCNC: 32 G/DL (ref 31–36)
MCV RBC AUTO: 96 FL (ref 80–97)
MONOCYTES # BLD AUTO: 0.9 10^3/UL (ref 0–0.8)
NEUTROPHILS # BLD AUTO: 8.6 10^3/UL (ref 1.5–7.7)
NRBC # BLD AUTO: 0 10^3/UL
NRBC BLD QL AUTO: 0
PLATELET # BLD AUTO: 280 10^3/UL (ref 150–450)
RBC # BLD AUTO: 4.38 10^6/UL (ref 4–5.4)
WBC # BLD AUTO: 11.9 10^3/UL (ref 3.5–10.8)

## 2018-05-26 PROCEDURE — 87088 URINE BACTERIA CULTURE: CPT

## 2018-05-26 PROCEDURE — 99284 EMERGENCY DEPT VISIT MOD MDM: CPT

## 2018-05-26 PROCEDURE — 81015 MICROSCOPIC EXAM OF URINE: CPT

## 2018-05-26 PROCEDURE — 93970 EXTREMITY STUDY: CPT

## 2018-05-26 PROCEDURE — 87077 CULTURE AEROBIC IDENTIFY: CPT

## 2018-05-26 PROCEDURE — 36415 COLL VENOUS BLD VENIPUNCTURE: CPT

## 2018-05-26 PROCEDURE — 93005 ELECTROCARDIOGRAM TRACING: CPT

## 2018-05-26 PROCEDURE — 82550 ASSAY OF CK (CPK): CPT

## 2018-05-26 PROCEDURE — 81003 URINALYSIS AUTO W/O SCOPE: CPT

## 2018-05-26 PROCEDURE — 83605 ASSAY OF LACTIC ACID: CPT

## 2018-05-26 PROCEDURE — 96361 HYDRATE IV INFUSION ADD-ON: CPT

## 2018-05-26 PROCEDURE — 87184 SC STD DISK METHOD PER PLATE: CPT

## 2018-05-26 PROCEDURE — 85610 PROTHROMBIN TIME: CPT

## 2018-05-26 PROCEDURE — 83880 ASSAY OF NATRIURETIC PEPTIDE: CPT

## 2018-05-26 PROCEDURE — 71045 X-RAY EXAM CHEST 1 VIEW: CPT

## 2018-05-26 PROCEDURE — 83735 ASSAY OF MAGNESIUM: CPT

## 2018-05-26 PROCEDURE — 96360 HYDRATION IV INFUSION INIT: CPT

## 2018-05-26 PROCEDURE — 87186 SC STD MICRODIL/AGAR DIL: CPT

## 2018-05-26 PROCEDURE — 85730 THROMBOPLASTIN TIME PARTIAL: CPT

## 2018-05-26 PROCEDURE — 70450 CT HEAD/BRAIN W/O DYE: CPT

## 2018-05-26 PROCEDURE — 83690 ASSAY OF LIPASE: CPT

## 2018-05-26 PROCEDURE — 87086 URINE CULTURE/COLONY COUNT: CPT

## 2018-05-26 PROCEDURE — 85025 COMPLETE CBC W/AUTO DIFF WBC: CPT

## 2018-05-26 PROCEDURE — 80053 COMPREHEN METABOLIC PANEL: CPT

## 2018-05-26 PROCEDURE — 84484 ASSAY OF TROPONIN QUANT: CPT

## 2018-05-26 NOTE — ED
Micha HEREDIA Stephanie, scribed for Trevon Valera on 18 at 1008 .





Complex/Multi-Sys Presentation





- HPI Summary


HPI Summary: 


The pt is an 88 y/o F BIBA to the ED with c/o muscle pain that began at 09:49. 

Symptoms include bilateral leg pain, bilateral feet pain and abd pain with deep 

breaths. The pt states she is unable to lift her head. The pt reports she is 

having trouble with her memory. 








- History Of Current Complaint


Chief Complaint: EDUrogenitalProblems


Time Seen by Provider: 18 09:51


Hx Obtained From: Patient, EMS


Onset/Duration: Gradual Onset, Still Present


Timing: Constant


Severity Currently: Mild


Aggravating Factor(s): Nothing


Alleviating Factor(s): Nothing


Associated Signs And Symptoms: Positive: Confusion, Other - bilateral leg pain, 

bilateral feet pain and abd pain with deep breaths





- Allergies/Home Medications


Allergies/Adverse Reactions: 


 Allergies











Allergy/AdvReac Type Severity Reaction Status Date / Time


 


Penicillins Allergy  Rash Verified 18 09:51











Home Medications: 


 Home Medications





HYDROcodone/ACETAMIN 5-325 MG* [Norco 5-325 TAB*] 1 tab PO Q4H PRN MDD 4  [History Confirmed 18]


Mycophenolate Mofetil TAB(*) [Cellcept TAB(*)] 500 mg PO BID 18 [History 

Confirmed 18]


Omeprazole CAP* [Prilosec CAP* 20 MG] 20 mg PO QAM 18 [History Confirmed 

18]











PMH/Surg Hx/FS Hx/Imm Hx


Endocrine/Hematology History: Reports: Hx Systemic Lupus Erythematosus, Other 

Endocrine/Hematological Disorders - LUPUS


   Denies: Hx Anticoagulant Therapy, Hx Blood Disorders, Hx Blood Transfusions, 

Hx Bone Marrow Disease, Hx Diabetes, Hx Sickle Cell Disease, Hx Thyroid Disease

, Hx Anemia, Hx Unexplained Bleeding


Cardiovascular History: Reports: Hx Deep Vein Thrombosis, Hx Embolism - Splenic 

artery thrombosis, Hx Hypertension


   Denies: Hx Congestive Heart Failure, Hx Pacemaker/ICD, Other Cardiovascular 

Problems/Disorders


Respiratory History: 


   Denies: Hx Asthma, Hx Chronic Obstructive Pulmonary Disease (COPD)


GI History: Reports: Hx Gastroesophageal Reflux Disease, Hx Irritable Bowel, Hx 

Obstructive Bowel - SBO, Other GI Disorders - Diverticulitis


   Denies: Hx Cirrhosis, Hx Crohn's Disease, Hx Diverticulosis, Hx Gall Bladder 

Disease, Hx Gastrointestinal Bleed, Hx Hiatal Hernia, Hx Jaundice, Hx Ileostomy

, Hx Pyloric Stenosis, Hx Ulcer


 History: Reports: Other  Problems/Disorders - UTIs several x's a yr


   Denies: Hx Dialysis, Hx Renal Disease


Musculoskeletal History: Reports: Hx Arthritis - DJD of neck, RA, Hx Back 

Problems, Hx Fibromyalgia, Other Musculoskeletal History - RHEUMATOID ARTHRITIS


   Denies: Hx Rheumatoid Arthritis, Hx Bursitis, Hx Congenital Bone 

Abnormalities, Hx Gout, Hx Orthopedic Injury, Hx Osteoporosis, Hx Scoliosis, Hx 

Tendonitis


Sensory History: Reports: Hx Cataracts, Hx Contacts or Glasses, Hx Vision 

Problem


   Denies: Hx Eye Injury, Hx Eye Prosthesis, Hx Glaucoma, Hx Macular 

Degeneration, Hx Deafness, Hx Hearing Aid, Hx Hearing Problem, Other Sensory 

Impairments


Opthamlomology History: Reports: Hx Cataracts, Hx Contacts or Glasses, Hx 

Vision Problem


   Denies: Hx Eye Injury, Hx Eye Prosthesis, Hx Glaucoma, Hx Macular 

Degeneration, Other Sensory Impairments


Neurological History: Reports: Other Neuro Impairments/Disorders - Tardive 

dyskinesia


   Denies: Hx Dementia, Hx Seizures


Psychiatric History: Reports: Hx Anxiety - LUPUS RELATED, Hx Eating Disorder, 

Hx Depression - LUPUS RELATED


   Denies: Hx Attention Deficit Hyperactivity Disorder, Hx Panic Disorder, Hx 

Post Traumatic Stress Disorder, Hx Inpatient Treatment, Hx Schizophrenia, Hx 

Bipolar Disorder, Hx Suicide Attempt, Hx Substance Abuse, Other Psychiatric 

Issues/Disorders





- Cancer History


Hx Chemotherapy: No





- Surgical History


Surgery Procedure, Year, and Place: .  APPENDECTOMY 40 YEARS AGO (?)


Hx Anesthesia Reactions: No


Infectious Disease History: No


Infectious Disease History: 


   Denies: Hx Clostridium Difficile, Hx Hepatitis, Hx Human Immunodeficiency 

Virus (HIV), Hx of Known/Suspected MRSA, Hx Shingles, Hx Tuberculosis, Traveled 

Outside the [place] in Last 30 Days





- Family History


Known Family History: 


   Negative: Hypertension





- Social History


Occupation: Retired


[facility]: [facility]


Alcohol Use: None


Hx Substance Use: No


Substance Use Type: Reports: None


Hx Tobacco Use: Yes


Smoking Status (MU): Former Smoker


Type: Cigarettes


Amount Used/How Often: 40 years


Have You Smoked in the Last Year: No





Review of Systems


Positive: Other - "unable to lift head".  Negative: Fever


Positive: Abdominal Pain - with deep breaths


Positive: Other - bilateral leg pain, bilateral feet pain


Neurological: Other - difficulty with memory recently


All Other Systems Reviewed And Are Negative: Yes





Physical Exam





- Summary


Physical Exam Summary: 


Appearance: Well appearing, no pain distress


Skin: warm, dry, reflects adequate perfusion


Head/face: normal


Eyes: EOMI, LYNN


ENT: dry mucous membranes


Neck: supple, non-tender


Respiratory: CTA, breath sounds present


Cardiovascular: RRR, pulses symmetrical


Abdomen: non-tender, soft


Bowel: present


Musculoskeletal: strength/ROM intact, tenderness over L chest


Neuro: sensory motor intact, alert and confused








Triage Information Reviewed: Yes


Vital Signs On Initial Exam: 


 Initial Vitals











Temp Pulse Resp BP Pulse Ox


 


 99.7 F   87   16   156/106   98 


 


 18 09:49  18 09:49  18 09:49  18 09:49  18 09:49











Vital Signs Reviewed: Yes





Diagnostics





- Vital Signs


 Vital Signs











  Temp Pulse Resp BP Pulse Ox


 


 18 09:49  99.7 F  87  16  156/106  98














- Laboratory


Lab Results: 


 Lab Results











  18 Range/Units





  10:10 10:10 10:10 


 


WBC  11.9 H    (3.5-10.8)  10^3/ul


 


RBC  4.38    (4.0-5.4)  10^6/ul


 


Hgb  13.2    (12.0-16.0)  g/dl


 


Hct  42    (35-47)  %


 


MCV  96    (80-97)  fL


 


MCH  30    (27-31)  pg


 


MCHC  32    (31-36)  g/dl


 


RDW  15    (10.5-15)  %


 


Plt Count  280    (150-450)  10^3/ul


 


MPV  7.5    (7.4-10.4)  um3


 


Neut % (Auto)  72.9    (38-83)  %


 


Lymph % (Auto)  17.7 L    (25-47)  %


 


Mono % (Auto)  7.9 H    (0-7)  %


 


Eos % (Auto)  0.7    (0-6)  %


 


Baso % (Auto)  0.8    (0-2)  %


 


Absolute Neuts (auto)  8.6 H    (1.5-7.7)  10^3/ul


 


Absolute Lymphs (auto)  2.1    (1.0-4.8)  10^3/ul


 


Absolute Monos (auto)  0.9 H    (0-0.8)  10^3/ul


 


Absolute Eos (auto)  0.1    (0-0.6)  10^3/ul


 


Absolute Basos (auto)  0.1    (0-0.2)  10^3/ul


 


Absolute Nucleated RBC  0    10^3/ul


 


Nucleated RBC %  0    


 


INR (Anticoag Therapy)   3.53 H   (0.77-1.02)  


 


APTT   39.8 H   (26.0-36.3)  seconds


 


Sodium    139  (139-145)  mmol/L


 


Potassium    3.6  (3.5-5.0)  mmol/L


 


Chloride    107  (101-111)  mmol/L


 


Carbon Dioxide    24  (22-32)  mmol/L


 


Anion Gap    8  (2-11)  mmol/L


 


BUN    19  (6-24)  mg/dL


 


Creatinine    0.60  (0.51-0.95)  mg/dL


 


Est GFR ( Amer)    121.6  (>60)  


 


Est GFR (Non-Af Amer)    94.6  (>60)  


 


BUN/Creatinine Ratio    31.7 H  (8-20)  


 


Glucose    86  ()  mg/dL


 


Lactic Acid     (0.5-2.0)  mmol/L


 


Calcium    8.4 L  (8.6-10.3)  mg/dL


 


Magnesium    1.9  (1.9-2.7)  mg/dL


 


Total Bilirubin    0.30  (0.2-1.0)  mg/dL


 


AST    13  (13-39)  U/L


 


ALT    13  (7-52)  U/L


 


Alkaline Phosphatase    54  ()  U/L


 


Total Creatine Kinase    36  ()  U/L


 


Troponin I    0.00  (<0.04)  ng/mL


 


B-Natriuretic Peptide    ( - 100) pg/mL


 


Total Protein    5.6 L  (6.4-8.9)  g/dL


 


Albumin    3.3  (3.2-5.2)  g/dL


 


Globulin    2.3  (2-4)  g/dL


 


Albumin/Globulin Ratio    1.4  (1-3)  


 


Lipase    20  (11.0-82.0)  U/L


 


Urine Color     


 


Urine Appearance     


 


Urine pH     (5-9)  


 


Ur Specific Gravity     (1.010-1.030)  


 


Urine Protein     (Negative)  


 


Urine Ketones     (Negative)  


 


Urine Blood     (Negative)  


 


Urine Nitrate     (Negative)  


 


Urine Bilirubin     (Negative)  


 


Urine Urobilinogen     (Negative)  


 


Ur Leukocyte Esterase     (Negative)  


 


Urine WBC (Auto)     (Absent)  


 


Urine RBC (Auto)     (Absent)  


 


Urine Bacteria     (Absent)  


 


Urine Glucose     (Negative)  














  18 Range/Units





  10:10 10:10 10:30 


 


WBC     (3.5-10.8)  10^3/ul


 


RBC     (4.0-5.4)  10^6/ul


 


Hgb     (12.0-16.0)  g/dl


 


Hct     (35-47)  %


 


MCV     (80-97)  fL


 


MCH     (27-31)  pg


 


MCHC     (31-36)  g/dl


 


RDW     (10.5-15)  %


 


Plt Count     (150-450)  10^3/ul


 


MPV     (7.4-10.4)  um3


 


Neut % (Auto)     (38-83)  %


 


Lymph % (Auto)     (25-47)  %


 


Mono % (Auto)     (0-7)  %


 


Eos % (Auto)     (0-6)  %


 


Baso % (Auto)     (0-2)  %


 


Absolute Neuts (auto)     (1.5-7.7)  10^3/ul


 


Absolute Lymphs (auto)     (1.0-4.8)  10^3/ul


 


Absolute Monos (auto)     (0-0.8)  10^3/ul


 


Absolute Eos (auto)     (0-0.6)  10^3/ul


 


Absolute Basos (auto)     (0-0.2)  10^3/ul


 


Absolute Nucleated RBC     10^3/ul


 


Nucleated RBC %     


 


INR (Anticoag Therapy)     (0.77-1.02)  


 


APTT     (26.0-36.3)  seconds


 


Sodium     (139-145)  mmol/L


 


Potassium     (3.5-5.0)  mmol/L


 


Chloride     (101-111)  mmol/L


 


Carbon Dioxide     (22-32)  mmol/L


 


Anion Gap     (2-11)  mmol/L


 


BUN     (6-24)  mg/dL


 


Creatinine     (0.51-0.95)  mg/dL


 


Est GFR ( Amer)     (>60)  


 


Est GFR (Non-Af Amer)     (>60)  


 


BUN/Creatinine Ratio     (8-20)  


 


Glucose     ()  mg/dL


 


Lactic Acid  1.4    (0.5-2.0)  mmol/L


 


Calcium     (8.6-10.3)  mg/dL


 


Magnesium     (1.9-2.7)  mg/dL


 


Total Bilirubin     (0.2-1.0)  mg/dL


 


AST     (13-39)  U/L


 


ALT     (7-52)  U/L


 


Alkaline Phosphatase     ()  U/L


 


Total Creatine Kinase     ()  U/L


 


Troponin I     (<0.04)  ng/mL


 


B-Natriuretic Peptide   125 H  ( - 100) pg/mL


 


Total Protein     (6.4-8.9)  g/dL


 


Albumin     (3.2-5.2)  g/dL


 


Globulin     (2-4)  g/dL


 


Albumin/Globulin Ratio     (1-3)  


 


Lipase     (11.0-82.0)  U/L


 


Urine Color    Yellow  


 


Urine Appearance    Cloudy  


 


Urine pH    7  (5-9)  


 


Ur Specific Gravity    1.010  (1.010-1.030)  


 


Urine Protein    1+(30 mg/dl) A  (Negative)  


 


Urine Ketones    Negative  (Negative)  


 


Urine Blood    2+ A  (Negative)  


 


Urine Nitrate    Negative  (Negative)  


 


Urine Bilirubin    Negative  (Negative)  


 


Urine Urobilinogen    Negative  (Negative)  


 


Ur Leukocyte Esterase    3+ A  (Negative)  


 


Urine WBC (Auto)    2+(11-20/hpf) A  (Absent)  


 


Urine RBC (Auto)    1+(3-5/hpf) A  (Absent)  


 


Urine Bacteria    1+ A  (Absent)  


 


Urine Glucose    Negative  (Negative)  











Result Diagrams: 


 18 10:10





 18 10:10


Lab Statement: Any lab studies that have been ordered have been reviewed, and 

results considered in the medical decision making process.





- Radiology


  ** CXR


Xray Interpretation: No Acute Changes


Radiology Interpretation Completed By: Radiologist - HYPERINFLATION. NO ACTIVE 

CARDIOPULMONARY DISEASE.  ED physician has reviewed this report.





- CT


  ** Brain


CT Interpretation: No Acute Changes


CT Interpretation Completed By: Radiologist - 1.  NO ACUTE INTRACRANIAL 

PATHOLOGY. 2.  CHRONIC SMALL VESSEL ISCHEMIC CHANGE. ED physician has reviewed 

this report.





- EKG


  ** 10:04


Cardiac Rate: NL


EKG Rhythm: Sinus Rhythm - 88 BPM


ST Segment: Normal


Ectopy: None


EKG Interpretation: No acute changes





- Additional Comments


Diagnostic Additional Comments: 


Venous Doppler reveals:


NO RIGHT LOWER EXTREMITY DEEP VEIN THROMBOSIS.


NO LEFT LOWER EXTREMITY DEEP VEIN THROMBOSIS. ED physician has reviewed this 

report. 














Complex Multi-Symp Course/Dx


Course Of Treatment: The pt is an 88 y/o F BIBA to the ED with c/o muscle pain 

that began at 09:49. Symptoms include bilateral leg pain, bilateral feet pain 

and abd pain with deep breaths.





- Diagnoses


Differential Diagnoses/HQI/PQRI: Sepsis, Urinary Tract Infection, Other - 

dementia


Provider Diagnoses: 


 UTI (urinary tract infection), Dementia








Discharge





- Sign-Out/Discharge


Documenting (check all that apply): Discharge/Admit/Transfer - Discharge





- Discharge Plan


Condition: Stable


Disposition: HOME


Prescriptions: 


Levofloxacin TAB* [Levaquin TAB*] 500 mg PO DAILY #4 tab


Patient Education Materials:  Urinary Tract Infection in Older Adults (ED)


Referrals: 


Chato Sapp MD [Primary Care Provider] - 3 Days


Additional Instructions: 


Return to the ED for new or worsening symptoms. 





- Billing Disposition and Condition


Condition: STABLE


Disposition: HOME





The documentation as recorded by the Micha yi Stephanie accurately reflects 

the service I personally performed and the decisions made by Soto grier Emmanuel.

## 2018-05-26 NOTE — RAD
HISTORY: Weakness



COMPARISONS: October 16, 2017



TECHNIQUE: Multiple contiguous axial CT scans were obtained of the head without 

intravenous contrast. 



FINDINGS: 

HEMORRHAGE/INFARCT: There is no hemorrhage or acute infarct.

MASSES/SHIFT: There is no mass or shift.



EXTRA-AXIAL SPACES: There are no extra-axial fluid collections.

SULCI AND VENTRICLES: The sulci and ventricles are normal in size and position for the

patient's stated age.



CEREBRUM: There is hypoattenuation of the periventricular and subcortical white matter.

BRAINSTEM: There are no focal parenchymal abnormalities.

CEREBELLUM: There are no focal parenchymal abnormalities.



VESSELS: There is calcification of the cavernous segments of the internal carotid arteries

bilaterally and of the distal vertebral arteries bilaterally.

PARANASAL SINUSES: The paranasal sinuses are clear.

ORBITS: The orbits are unremarkable.

BONES AND SOFT TISSUE: No bone or soft tissue abnormalities are noted.



OTHER: None



IMPRESSION: 

1.  NO ACUTE INTRACRANIAL PATHOLOGY.

2.  CHRONIC SMALL VESSEL ISCHEMIC CHANGE.

## 2018-05-26 NOTE — RAD
HISTORY: Chest pain



COMPARISONS: October 16, 2017



VIEWS: 1: frontal portable view of the chest at 10:45 AM



FINDINGS:

LINES AND TUBES: None.

CARDIOMEDIASTINAL SILHOUETTE: The cardiomediastinal silhouette is normal for portable

technique.

PLEURA: The costophrenic angles are sharp. No pleural abnormalities are noted.

LUNG PARENCHYMA: There is hyperinflation.

ABDOMEN: The upper abdomen is clear. There is no subphrenic gas.

BONES AND SOFT TISSUES: There is a scoliotic curvature of the spine. Mild degenerative

changes are noted.



IMPRESSION: HYPERINFLATION. NO ACTIVE CARDIOPULMONARY DISEASE.

## 2018-05-26 NOTE — RAD
HISTORY: Bilateral leg pain



COMPARISONS: May 09, 2015



TECHNIQUE: Multiple transverse and longitudinal ultrasound images were obtained of the

bilateral lower extremities  from the level of the common femoral vein inferiorly through

to the infrapopliteal veins  using grayscale, color Doppler, and spectral Doppler imaging

with and without compression and with augmentation.    



FINDINGS:

VEINS: The venous system of the bilateral lower extremities  is compressible throughout

its course, with normal flow on color Doppler imaging and normal response to augmentation

on spectral Doppler imaging.



SOFT TISSUES: Unremarkable.



OTHER FINDINGS: None.



IMPRESSION: 

NO RIGHT LOWER EXTREMITY DEEP VEIN THROMBOSIS.

NO LEFT LOWER EXTREMITY DEEP VEIN THROMBOSIS

## 2018-05-28 NOTE — PN
Progress Note





- Progress Note


Date of Service: 05/28/18


Note: 





Urine culture preliminary grew Proteus Mirabilis > 100,000


Patient is placed on Levaquin prior to discharge


Levaquin likely sensitive to Proteus species


We will await sensitivities


Nothing further at this time


Nevin Swartz, PAC

## 2018-05-30 NOTE — PN
Progress Note





- Progress Note


Date of Service: 05/26/18


Note: 


Pt. seen in ER 5/26 and started on levaquin for UTI. Urine culture today is 

growing >100,000 proteus mirabilis susceptible to levaquin. No change in 

treatment needed at this time.

## 2018-06-10 ENCOUNTER — HOSPITAL ENCOUNTER (EMERGENCY)
Dept: HOSPITAL 25 - ED | Age: 83
LOS: 1 days | Discharge: HOME | End: 2018-06-11
Payer: MEDICARE

## 2018-06-10 DIAGNOSIS — R10.9: ICD-10-CM

## 2018-06-10 DIAGNOSIS — R53.1: ICD-10-CM

## 2018-06-10 DIAGNOSIS — Z87.891: ICD-10-CM

## 2018-06-10 DIAGNOSIS — K57.30: Primary | ICD-10-CM

## 2018-06-10 LAB
BASOPHILS # BLD AUTO: 0.1 10^3/UL (ref 0–0.2)
EOSINOPHIL # BLD AUTO: 0 10^3/UL (ref 0–0.6)
HCT VFR BLD AUTO: 44 % (ref 35–47)
HGB BLD-MCNC: 13.7 G/DL (ref 12–16)
LYMPHOCYTES # BLD AUTO: 1.9 10^3/UL (ref 1–4.8)
MCH RBC QN AUTO: 30 PG (ref 27–31)
MCHC RBC AUTO-ENTMCNC: 32 G/DL (ref 31–36)
MCV RBC AUTO: 96 FL (ref 80–97)
MONOCYTES # BLD AUTO: 0.7 10^3/UL (ref 0–0.8)
NEUTROPHILS # BLD AUTO: 9.3 10^3/UL (ref 1.5–7.7)
NRBC # BLD AUTO: 0 10^3/UL
NRBC BLD QL AUTO: 0.1
PLATELET # BLD AUTO: 327 10^3/UL (ref 150–450)
RBC # BLD AUTO: 4.55 10^6/UL (ref 4–5.4)
WBC # BLD AUTO: 12.1 10^3/UL (ref 3.5–10.8)

## 2018-06-10 PROCEDURE — 81003 URINALYSIS AUTO W/O SCOPE: CPT

## 2018-06-10 PROCEDURE — 83735 ASSAY OF MAGNESIUM: CPT

## 2018-06-10 PROCEDURE — 93005 ELECTROCARDIOGRAM TRACING: CPT

## 2018-06-10 PROCEDURE — 96365 THER/PROPH/DIAG IV INF INIT: CPT

## 2018-06-10 PROCEDURE — 86140 C-REACTIVE PROTEIN: CPT

## 2018-06-10 PROCEDURE — 36415 COLL VENOUS BLD VENIPUNCTURE: CPT

## 2018-06-10 PROCEDURE — 74177 CT ABD & PELVIS W/CONTRAST: CPT

## 2018-06-10 PROCEDURE — 85025 COMPLETE CBC W/AUTO DIFF WBC: CPT

## 2018-06-10 PROCEDURE — 80053 COMPREHEN METABOLIC PANEL: CPT

## 2018-06-10 PROCEDURE — 87040 BLOOD CULTURE FOR BACTERIA: CPT

## 2018-06-10 PROCEDURE — 82150 ASSAY OF AMYLASE: CPT

## 2018-06-10 PROCEDURE — 99283 EMERGENCY DEPT VISIT LOW MDM: CPT

## 2018-06-10 PROCEDURE — 87086 URINE CULTURE/COLONY COUNT: CPT

## 2018-06-10 PROCEDURE — 83690 ASSAY OF LIPASE: CPT

## 2018-06-10 PROCEDURE — 83605 ASSAY OF LACTIC ACID: CPT

## 2018-06-10 PROCEDURE — 81015 MICROSCOPIC EXAM OF URINE: CPT

## 2018-06-11 VITALS — SYSTOLIC BLOOD PRESSURE: 178 MMHG | DIASTOLIC BLOOD PRESSURE: 90 MMHG

## 2018-06-11 NOTE — RAD
CLINICAL HISTORY: abd pain



COMPARISON: September 27, 2017



TECHNIQUE: Multiple contiguous axial CT scans were obtained of the abdomen and pelvis

after the administration of intravenous contrast. Coronal and sagittal multiplanar

reformations are submitted for review.  Oral contrast was not administered.  Delayed

images were obtained through the abdomen.



FINDINGS:



LUNG BASES: There is a trace left pleural effusion.

Coronary artery calcification is noted.

LIVER: The liver is diffusely low in attenuation compared to the spleen. There are no

focal hepatic parenchymal masses.

BILE DUCTS: There is no intrahepatic or extrahepatic biliary dilatation.

GALLBLADDER: The gallbladder is normal, without pericholecystic inflammatory change.



PANCREAS: The pancreas is normal, without mass or ductal dilatation.

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



UPPER GI TRACT: Evaluation of the gastrointestinal tract is limited by incomplete gastric

distention. There is a 3 cm diverticulum of the second stage of the duodenum.

SMALL BOWEL AND MESENTERY: The small bowel is normal in contour, course, and caliber.

There is no obstruction or dilatation.

COLON: There are multiple diverticula of the sigmoid colon. There is no pericolonic

inflammatory change. The appendix not clearly visualized. There is no inflammatory change

within the right lower quadrant.



ADRENALS: Normal bilaterally.

KIDNEYS: There are multiple renal cysts bilaterally. On the left, these are multiseptated

with calcified septa without enhancement. This is stable from September 27, 2017,

consistent with positive type II cysts. There is no appreciable hydronephrosis or

nephrolithiasis.

BLADDER: The bladder is smooth in contour.



PELVIC ORGANS: There are calcified uterine fibroids.



AORTA: There is calcific atherosclerotic disease of the abdominal aorta and its branches,

without aneurysmal dilatation

IVC: Unremarkable



LYMPH NODES: There is no lymphadenopathy by size criteria.



ABDOMINAL WALL: There is no evidence for abdominal wall hernia.

BONES AND SOFT TISSUES: There is a scoliotic curvature of the spine. Degenerative changes

are noted.

OTHER: None



IMPRESSION:

1.  DIVERTICULOSIS.

2.  ATHEROSCLEROSIS, INCLUDING CORONARY ARTERY DISEASE.

3.  DUODENAL DIVERTICULUM.

4.  FIBROID UTERUS.

5.  STABLE BOSNIAK TYPE II RENAL CYSTS.

6.  TRACE LEFT PLEURAL EFFUSION.

## 2018-06-12 NOTE — ED
Antonio HEREDIA Rebecca, scribed for Rosas Peralta MD on 06/10/18 at 2005 .





Altered Mental Status





- HPI Summary


HPI Summary: 


Pt is an 88 y/o F BIBA who presents to ED due to AMS characterized as 

confusion. Family report that since her last UTI 2 weeks ago that she has been 

increasingly confused accompanied by generalized weakness. Additionally c/o 

suprapubic abdominal pain. Denies vomiting. Able to recall name and , but 

per nurse's triage, her family states that she cannot recall their names or 

recent events. Pt was given Levaquin for UTI and was discharged back to home. 

Lives at home with her daughter. 








- History Of Current Complaint


Chief Complaint: EDAltMentalStatus


Stated Complaint: WEAKNESS


Time Seen by Provider: 06/10/18 19:47


Hx Obtained From: Patient, Family/Caretaker - Family, Medical Records - Nurse's 

triage


Onset/Duration: Still Present


Severity Currently: Severe - 10/10 pain


Character: Confusion


Associated Signs And Symptoms: Positive: Weakness - Generalized


Related History: Other: - Since UTI





- Allergies/Home Medications


Allergies/Adverse Reactions: 


 Allergies











Allergy/AdvReac Type Severity Reaction Status Date / Time


 


Penicillins Allergy  Rash Verified 06/10/18 19:54











Home Medications: 


 Home Medications





Ciprofloxacin HCl [Cipro] 250 mg PO BID 06/10/18 [History Confirmed 06/10/18]


Warfarin TAB(*) [Coumadin TAB(*)] 2.5 mg PO SEE INSTRUCTIONS 06/10/18 [History 

Confirmed 06/10/18]











PMH/Surg Hx/FS Hx/Imm Hx


Endocrine/Hematology History: Reports: Hx Systemic Lupus Erythematosus, Other 

Endocrine/Hematological Disorders - LUPUS


   Denies: Hx Anticoagulant Therapy, Hx Blood Disorders, Hx Blood Transfusions, 

Hx Bone Marrow Disease, Hx Diabetes, Hx Sickle Cell Disease, Hx Thyroid Disease

, Hx Anemia, Hx Unexplained Bleeding


Cardiovascular History: Reports: Hx Deep Vein Thrombosis, Hx Embolism - Splenic 

artery thrombosis, Hx Hypertension


   Denies: Hx Congestive Heart Failure, Hx Pacemaker/ICD, Other Cardiovascular 

Problems/Disorders


Respiratory History: 


   Denies: Hx Asthma, Hx Chronic Obstructive Pulmonary Disease (COPD)


GI History: Reports: Hx Gastroesophageal Reflux Disease, Hx Irritable Bowel, Hx 

Obstructive Bowel - SBO, Other GI Disorders - Diverticulitis


   Denies: Hx Cirrhosis, Hx Crohn's Disease, Hx Diverticulosis, Hx Gall Bladder 

Disease, Hx Gastrointestinal Bleed, Hx Hiatal Hernia, Hx Jaundice, Hx Ileostomy

, Hx Pyloric Stenosis, Hx Ulcer


 History: Reports: Other  Problems/Disorders - UTIs several x's a yr


   Denies: Hx Dialysis, Hx Renal Disease


Musculoskeletal History: Reports: Hx Arthritis - DJD of neck, RA, Hx Back 

Problems, Hx Fibromyalgia, Other Musculoskeletal History - RHEUMATOID ARTHRITIS


   Denies: Hx Rheumatoid Arthritis, Hx Bursitis, Hx Congenital Bone 

Abnormalities, Hx Gout, Hx Orthopedic Injury, Hx Osteoporosis, Hx Scoliosis, Hx 

Tendonitis


Sensory History: Reports: Hx Cataracts, Hx Contacts or Glasses, Hx Vision 

Problem


   Denies: Hx Eye Injury, Hx Eye Prosthesis, Hx Glaucoma, Hx Macular 

Degeneration, Hx Deafness, Hx Hearing Aid, Hx Hearing Problem, Other Sensory 

Impairments


Opthamlomology History: Reports: Hx Cataracts, Hx Contacts or Glasses, Hx 

Vision Problem


   Denies: Hx Eye Injury, Hx Eye Prosthesis, Hx Glaucoma, Hx Macular 

Degeneration, Other Sensory Impairments


Neurological History: Reports: Other Neuro Impairments/Disorders - Tardive 

dyskinesia


   Denies: Hx Dementia, Hx Seizures


Psychiatric History: Reports: Hx Anxiety - LUPUS RELATED, Hx Eating Disorder, 

Hx Depression - LUPUS RELATED


   Denies: Hx Attention Deficit Hyperactivity Disorder, Hx Panic Disorder, Hx 

Post Traumatic Stress Disorder, Hx Inpatient Treatment, Hx Schizophrenia, Hx 

Bipolar Disorder, Hx Suicide Attempt, Hx Substance Abuse, Other Psychiatric 

Issues/Disorders





- Cancer History


Hx Chemotherapy: No





- Surgical History


Surgery Procedure, Year, and Place: .  APPENDECTOMY 40 YEARS AGO (?)


Hx Anesthesia Reactions: No


Infectious Disease History: No


Infectious Disease History: 


   Denies: Hx Clostridium Difficile, Hx Hepatitis, Hx Human Immunodeficiency 

Virus (HIV), Hx of Known/Suspected MRSA, Hx Shingles, Hx Tuberculosis, Traveled 

Outside the [place] in Last 30 Days





- Family History


Known Family History: 


   Negative: Hypertension





- Social History


Alcohol Use: None


Hx Substance Use: No


Substance Use Type: Reports: None


Hx Tobacco Use: Yes


Smoking Status (MU): Former Smoker


Type: Cigarettes


Amount Used/How Often: 40 years


Have You Smoked in the Last Year: No





Review of Systems


Positive: Other - Generalized weakness


Positive: Abdominal Pain.  Negative: Vomiting


Neurological: Other - AMS - confused


All Other Systems Reviewed And Are Negative: Yes





Physical Exam





- Summary


Physical Exam Summary: 


VITAL SIGNS: Reviewed.


GENERAL: ~Patient is a well-developed and nourished female who is lying 

comfortable in the stretcher. Patient is not in any acute respiratory distress. 

SHe seems dehydrated. 


HEAD AND FACE: No signs of trauma. No ecchymosis, hematomas or skull 

depressions. No sinus tenderness.


EYES: PERRLA, EOMI x 2, No injected conjunctiva, no nystagmus.


EARS: Hearing grossly intact. Ear canals and tympanic membranes are within 

normal limits.


MOUTH: Oropharynx within normal limits.


NECK: Supple, trachea is midline, no adenopathy, no JVD, no carotid bruit, no c-

spine tenderness, neck with full ROM.


CHEST: Symmetric, no tenderness at palpation


LUNGS: Clear to auscultation bilaterally. No wheezing or crackles.


CVS: Regular rate and rhythm, S1 and S2 present, no murmurs or gallops 

appreciated.


ABDOMEN: Soft, non-tender. Distended No rebound no guarding, and no masses 

palpated. Hypoactive bowel sounds.


EXTREMITIES: FROM in all major joints, no edema, no cyanosis or clubbing.


NEURO: Alert and oriented x 3. No acute neurological deficits. Speech is normal 

and follows commands.


SKIN: Dry and warm





Triage Information Reviewed: Yes


Vital Signs On Initial Exam: 


 Initial Vitals











Temp Pulse Resp BP Pulse Ox


 


 97.8 F   70   14   169/94   98 


 


 06/10/18 19:52  06/10/18 19:52  06/10/18 19:52  06/10/18 19:52  06/10/18 19:52











Vital Signs Reviewed: Yes





Diagnostics





- Vital Signs


 Vital Signs











  Temp Pulse Resp BP Pulse Ox


 


 06/10/18 19:52  97.8 F  70  14  169/94  98














- Laboratory


Result Diagrams: 


 06/10/18 21:22





 06/10/18 21:22


Lab Statement: Any lab studies that have been ordered have been reviewed, and 

results considered in the medical decision making process.





- CT


  ** CT Abd/Pel


CT Interpretation Completed By: Radiologist - Calcified coronary artery 

arteriosclerosis. Small left pleural effusion. Hypodense lesion in the spleen 

unchanged. Complex nonspecific bilateral renal cortical cysts. Moderate 

arteriosclerosis of the abdominal and pelvic vasculature. Large gas and fluid-

filled duodenal diverticulum extends medically off the second portion of the 

duodenum near the pancreatic head may be associated with bacterial overgrowth 

of the proximal small bowel. Diverticulosis of the colon without 

diverticulitis. Heterogeneous calcified uterine mass most likely due to uterine 

fibroids.  ED physician reviewed this report. Pending official report.





- EKG


  ** 


Cardiac Rate: NL - 68 bpm


EKG Rhythm: Sinus Rhythm


EKG Interpretation: Short WA, LAD, no ischemic changes





Re-Evaluation





- Re-Evaluation


  ** First Eval


Re-Evaluation Time: 23:40


Comment: Discussed results and D/C plan.





Altered Mental Statu Course/Dx





- Course


Assessment/Plan: Pt is an 88 y/o F BIBA who presents to ED due to AMS 

characterized as confusion with generalized weaknessfor 2 weeks, since her last 

UTI. Additionally c/o suprapubic abdominal pain. Denies vomiting. Able to 

recall name and , but per nurse's triage, her family states that she cannot 

recall their names or recent events. Blood work and UA were done with blood 

results including a WBC of 12.1, lipase <10 L, amylase of 23 L, and CRP of 5.71 

H.  CT Abd/Pel findings above. EKG is sinus rhythm with short RP, LAD and no 

ischemic changes. In the ED course, pt was given Rocephin and fluids. She will 

be D/C to home with Dx of colon diverticulum with Rx for Xifaxan and a follow 

up with her PCP. Allergy noted.





- Diagnoses


Provider Diagnoses: 


 Colonic diverticulum








Discharge





- Sign-Out/Discharge


Documenting (check all that apply): Discharge/Admit/Transfer - Discharge





- Discharge Plan


Condition: Stable


Disposition: HOME


Prescriptions: 


RiFAXimin* [Xifaxan*] 550 mg PO TID #20 tab


Patient Education Materials:  Diverticulosis (ED)


Referrals: 


Chato Sapp MD [Primary Care Provider] - 3 Days


Additional Instructions: 


RETURN TO ED FOR ANY NEW OR WORSENING SYMPTOMS. 





The documentation as recorded by the Antonio yi Rebecca accurately 

reflects the service I personally performed and the decisions made by me, Rosas Peralta MD.